# Patient Record
Sex: MALE | Race: WHITE | Employment: OTHER | URBAN - NONMETROPOLITAN AREA
[De-identification: names, ages, dates, MRNs, and addresses within clinical notes are randomized per-mention and may not be internally consistent; named-entity substitution may affect disease eponyms.]

---

## 2017-05-17 ENCOUNTER — OFFICE VISIT (OUTPATIENT)
Dept: NEPHROLOGY | Age: 72
End: 2017-05-17

## 2017-05-17 VITALS
WEIGHT: 199 LBS | RESPIRATION RATE: 16 BRPM | HEART RATE: 64 BPM | BODY MASS INDEX: 24.87 KG/M2 | DIASTOLIC BLOOD PRESSURE: 64 MMHG | SYSTOLIC BLOOD PRESSURE: 126 MMHG

## 2017-05-17 DIAGNOSIS — E87.20 METABOLIC ACIDEMIA: ICD-10-CM

## 2017-05-17 DIAGNOSIS — R80.9 PROTEINURIA: ICD-10-CM

## 2017-05-17 DIAGNOSIS — I10 ESSENTIAL HYPERTENSION: ICD-10-CM

## 2017-05-17 DIAGNOSIS — N18.4 CKD (CHRONIC KIDNEY DISEASE) STAGE 4, GFR 15-29 ML/MIN (HCC): Primary | ICD-10-CM

## 2017-05-17 DIAGNOSIS — E55.9 VITAMIN D DEFICIENCY: ICD-10-CM

## 2017-05-17 PROCEDURE — 4040F PNEUMOC VAC/ADMIN/RCVD: CPT | Performed by: INTERNAL MEDICINE

## 2017-05-17 PROCEDURE — 99214 OFFICE O/P EST MOD 30 MIN: CPT | Performed by: INTERNAL MEDICINE

## 2017-05-17 PROCEDURE — 1036F TOBACCO NON-USER: CPT | Performed by: INTERNAL MEDICINE

## 2017-05-17 PROCEDURE — 3017F COLORECTAL CA SCREEN DOC REV: CPT | Performed by: INTERNAL MEDICINE

## 2017-05-17 PROCEDURE — G8420 CALC BMI NORM PARAMETERS: HCPCS | Performed by: INTERNAL MEDICINE

## 2017-05-17 PROCEDURE — 1123F ACP DISCUSS/DSCN MKR DOCD: CPT | Performed by: INTERNAL MEDICINE

## 2017-05-17 PROCEDURE — G8427 DOCREV CUR MEDS BY ELIG CLIN: HCPCS | Performed by: INTERNAL MEDICINE

## 2017-05-17 RX ORDER — SODIUM BICARBONATE 650 MG/1
650 TABLET ORAL 2 TIMES DAILY
Qty: 60 TABLET | Refills: 11 | Status: SHIPPED | OUTPATIENT
Start: 2017-05-17 | End: 2018-05-17

## 2017-11-06 ENCOUNTER — HOSPITAL ENCOUNTER (OUTPATIENT)
Age: 72
Discharge: HOME | End: 2017-11-06
Payer: MEDICARE

## 2017-11-06 DIAGNOSIS — E55.9 VITAMIN D DEFICIENCY: ICD-10-CM

## 2017-11-06 DIAGNOSIS — R80.9 PROTEINURIA: ICD-10-CM

## 2017-11-06 DIAGNOSIS — N18.4 CKD (CHRONIC KIDNEY DISEASE) STAGE 4, GFR 15-29 ML/MIN (HCC): ICD-10-CM

## 2017-11-06 LAB
ALBUMIN SERPL-MCNC: 4.3 G/DL (ref 3.5–5.1)
ANION GAP SERPL CALCULATED.3IONS-SCNC: 21 MEQ/L (ref 8–16)
BUN BLDV-MCNC: 53 MG/DL (ref 7–22)
CALCIUM SERPL-MCNC: 10 MG/DL (ref 8.5–10.5)
CHLORIDE BLD-SCNC: 108 MEQ/L (ref 98–111)
CO2: 18 MEQ/L (ref 23–33)
CREAT SERPL-MCNC: 2.9 MG/DL (ref 0.4–1.2)
CREATININE URINE: 90.9 MG/DL
GFR SERPL CREATININE-BSD FRML MDRD: 21 ML/MIN/1.73M2
GLUCOSE BLD-MCNC: 118 MG/DL (ref 70–108)
PHOSPHORUS: 3.6 MG/DL (ref 2.4–4.7)
POTASSIUM SERPL-SCNC: 4.8 MEQ/L (ref 3.5–5.2)
PROT/CREAT RATIO, UR: 1.28
PROTEIN, URINE: 116.5 MG/DL
PTH INTACT: 44.8 PG/ML (ref 15–65)
SODIUM BLD-SCNC: 147 MEQ/L (ref 135–145)
TOTAL PROTEIN: 8 G/DL (ref 6.1–8)
VITAMIN D 25-HYDROXY: 43 NG/ML (ref 30–100)

## 2017-11-06 PROCEDURE — 84155 ASSAY OF PROTEIN SERUM: CPT

## 2017-11-06 PROCEDURE — 83970 ASSAY OF PARATHORMONE: CPT

## 2017-11-06 PROCEDURE — 84100 ASSAY OF PHOSPHORUS: CPT

## 2017-11-06 PROCEDURE — 84156 ASSAY OF PROTEIN URINE: CPT

## 2017-11-06 PROCEDURE — 36415 COLL VENOUS BLD VENIPUNCTURE: CPT

## 2017-11-06 PROCEDURE — 80048 BASIC METABOLIC PNL TOTAL CA: CPT

## 2017-11-06 PROCEDURE — 82306 VITAMIN D 25 HYDROXY: CPT

## 2017-11-06 PROCEDURE — 82040 ASSAY OF SERUM ALBUMIN: CPT

## 2017-11-06 PROCEDURE — 82570 ASSAY OF URINE CREATININE: CPT

## 2017-11-15 ENCOUNTER — OFFICE VISIT (OUTPATIENT)
Dept: NEPHROLOGY | Age: 72
End: 2017-11-15
Payer: MEDICARE

## 2017-11-15 VITALS — DIASTOLIC BLOOD PRESSURE: 80 MMHG | BODY MASS INDEX: 25.5 KG/M2 | SYSTOLIC BLOOD PRESSURE: 124 MMHG | WEIGHT: 204 LBS

## 2017-11-15 DIAGNOSIS — R80.1 PERSISTENT PROTEINURIA: ICD-10-CM

## 2017-11-15 DIAGNOSIS — N18.4 CKD (CHRONIC KIDNEY DISEASE) STAGE 4, GFR 15-29 ML/MIN (HCC): Primary | ICD-10-CM

## 2017-11-15 DIAGNOSIS — E87.20 METABOLIC ACIDOSIS: ICD-10-CM

## 2017-11-15 DIAGNOSIS — E87.0 HYPERNATREMIA: ICD-10-CM

## 2017-11-15 DIAGNOSIS — I10 ESSENTIAL HYPERTENSION: ICD-10-CM

## 2017-11-15 PROCEDURE — 99214 OFFICE O/P EST MOD 30 MIN: CPT | Performed by: INTERNAL MEDICINE

## 2017-11-15 PROCEDURE — G8427 DOCREV CUR MEDS BY ELIG CLIN: HCPCS | Performed by: INTERNAL MEDICINE

## 2017-11-15 PROCEDURE — 4040F PNEUMOC VAC/ADMIN/RCVD: CPT | Performed by: INTERNAL MEDICINE

## 2017-11-15 PROCEDURE — G8419 CALC BMI OUT NRM PARAM NOF/U: HCPCS | Performed by: INTERNAL MEDICINE

## 2017-11-15 PROCEDURE — G8484 FLU IMMUNIZE NO ADMIN: HCPCS | Performed by: INTERNAL MEDICINE

## 2017-11-15 PROCEDURE — 1123F ACP DISCUSS/DSCN MKR DOCD: CPT | Performed by: INTERNAL MEDICINE

## 2017-11-15 PROCEDURE — 1036F TOBACCO NON-USER: CPT | Performed by: INTERNAL MEDICINE

## 2017-11-15 PROCEDURE — 3017F COLORECTAL CA SCREEN DOC REV: CPT | Performed by: INTERNAL MEDICINE

## 2017-11-15 NOTE — PROGRESS NOTES
ALKPHOS 84 11/16/2016    ALKPHOS 80 11/07/2016    ALKPHOS 67 04/20/2015     BNP: No results found for: BNP  Lipids:   Lab Results   Component Value Date    CHOL 201 (H) 11/07/2016    HDL 41 11/07/2016     INR: No results found for: INR  URINE:   Lab Results   Component Value Date    PROTUR 116.5 11/06/2017     No results found for: [place], PHUR, LABCAST, WBCUA, RBCUA, MUCUS, TRICHOMONAS, YEAST, BACTERIA, CLARITYU, SPECGRAV, LEUKOCYTESUR, UROBILINOGEN, BILIRUBINUR, BLOODU, GLUCOSEU, KETUA, AMORPHOUS   Microalbumen/Creatinine ratio:  No components found for: RUCREAT    Objective:   Vitals: /80   Wt 204 lb (92.5 kg)   BMI 25.50 kg/m²      Constitutional:  Alert, awake, no apparent distress  Skin:normal  HEENT:Pupils are reactive . Throat is clear  Neck:supple with no thyromegally  Cardiovascular:  S1, S2 without m/r/g  Respiratory:  CTA B without w/r/r  Abdomen: +bs, soft, nt  Ext: no LE edema  Musculoskeletal:Intact  Neuro:Alert and oriented with no deficit    Electronically signed by Lien Loredo MD on 11/15/2017 at 10:31 AM

## 2018-05-07 ENCOUNTER — HOSPITAL ENCOUNTER (OUTPATIENT)
Age: 73
Discharge: HOME | End: 2018-05-07
Payer: MEDICARE

## 2018-05-07 DIAGNOSIS — N18.4 CKD (CHRONIC KIDNEY DISEASE) STAGE 4, GFR 15-29 ML/MIN (HCC): ICD-10-CM

## 2018-05-07 LAB
ALBUMIN SERPL-MCNC: 3.8 G/DL (ref 3.5–5.1)
ALP BLD-CCNC: 74 U/L (ref 38–126)
ALT SERPL-CCNC: 12 U/L (ref 11–66)
ANION GAP SERPL CALCULATED.3IONS-SCNC: 18 MEQ/L (ref 8–16)
AST SERPL-CCNC: 13 U/L (ref 5–40)
BILIRUB SERPL-MCNC: 0.5 MG/DL (ref 0.3–1.2)
BUN BLDV-MCNC: 55 MG/DL (ref 7–22)
CALCIUM SERPL-MCNC: 8.7 MG/DL (ref 8.5–10.5)
CHLORIDE BLD-SCNC: 108 MEQ/L (ref 98–111)
CHOLESTEROL, FASTING: 175 MG/DL (ref 100–199)
CO2: 18 MEQ/L (ref 23–33)
CREAT SERPL-MCNC: 3.3 MG/DL (ref 0.4–1.2)
GFR SERPL CREATININE-BSD FRML MDRD: 18 ML/MIN/1.73M2
GLUCOSE BLD-MCNC: 106 MG/DL (ref 70–108)
HDLC SERPL-MCNC: 40 MG/DL
LDL CHOLESTEROL CALCULATED: 109 MG/DL
POTASSIUM SERPL-SCNC: 4.7 MEQ/L (ref 3.5–5.2)
SODIUM BLD-SCNC: 144 MEQ/L (ref 135–145)
TOTAL PROTEIN: 7.2 G/DL (ref 6.1–8)
TRIGLYCERIDE, FASTING: 131 MG/DL (ref 0–199)

## 2018-05-07 PROCEDURE — 80053 COMPREHEN METABOLIC PANEL: CPT

## 2018-05-07 PROCEDURE — 80061 LIPID PANEL: CPT

## 2018-05-07 PROCEDURE — 36415 COLL VENOUS BLD VENIPUNCTURE: CPT

## 2018-05-16 ENCOUNTER — OFFICE VISIT (OUTPATIENT)
Dept: NEPHROLOGY | Age: 73
End: 2018-05-16
Payer: MEDICARE

## 2018-05-16 VITALS
SYSTOLIC BLOOD PRESSURE: 108 MMHG | RESPIRATION RATE: 18 BRPM | HEART RATE: 60 BPM | WEIGHT: 202 LBS | BODY MASS INDEX: 25.25 KG/M2 | DIASTOLIC BLOOD PRESSURE: 62 MMHG

## 2018-05-16 DIAGNOSIS — R80.1 PERSISTENT PROTEINURIA: ICD-10-CM

## 2018-05-16 DIAGNOSIS — N18.4 CKD (CHRONIC KIDNEY DISEASE), STAGE IV (HCC): Primary | ICD-10-CM

## 2018-05-16 DIAGNOSIS — E55.9 VITAMIN D DEFICIENCY: ICD-10-CM

## 2018-05-16 DIAGNOSIS — E87.20 METABOLIC ACIDOSIS: ICD-10-CM

## 2018-05-16 DIAGNOSIS — I10 ESSENTIAL HYPERTENSION: ICD-10-CM

## 2018-05-16 PROCEDURE — G8427 DOCREV CUR MEDS BY ELIG CLIN: HCPCS | Performed by: INTERNAL MEDICINE

## 2018-05-16 PROCEDURE — 3017F COLORECTAL CA SCREEN DOC REV: CPT | Performed by: INTERNAL MEDICINE

## 2018-05-16 PROCEDURE — 99214 OFFICE O/P EST MOD 30 MIN: CPT | Performed by: INTERNAL MEDICINE

## 2018-05-16 PROCEDURE — 1036F TOBACCO NON-USER: CPT | Performed by: INTERNAL MEDICINE

## 2018-05-16 PROCEDURE — G8419 CALC BMI OUT NRM PARAM NOF/U: HCPCS | Performed by: INTERNAL MEDICINE

## 2018-05-16 PROCEDURE — 1123F ACP DISCUSS/DSCN MKR DOCD: CPT | Performed by: INTERNAL MEDICINE

## 2018-05-16 PROCEDURE — 4040F PNEUMOC VAC/ADMIN/RCVD: CPT | Performed by: INTERNAL MEDICINE

## 2018-05-16 RX ORDER — LISINOPRIL 10 MG/1
10 TABLET ORAL DAILY
Qty: 90 TABLET | Refills: 3 | Status: SHIPPED | OUTPATIENT
Start: 2018-05-16 | End: 2018-05-16

## 2018-08-06 ENCOUNTER — HOSPITAL ENCOUNTER (OUTPATIENT)
Age: 73
Discharge: HOME | End: 2018-08-06
Payer: MEDICARE

## 2018-08-06 DIAGNOSIS — N18.4 CKD (CHRONIC KIDNEY DISEASE), STAGE IV (HCC): ICD-10-CM

## 2018-08-06 DIAGNOSIS — E55.9 VITAMIN D DEFICIENCY: ICD-10-CM

## 2018-08-06 DIAGNOSIS — R80.1 PERSISTENT PROTEINURIA: ICD-10-CM

## 2018-08-06 LAB
ANION GAP SERPL CALCULATED.3IONS-SCNC: 18 MEQ/L (ref 8–16)
BUN BLDV-MCNC: 60 MG/DL (ref 7–22)
CALCIUM SERPL-MCNC: 9.5 MG/DL (ref 8.5–10.5)
CHLORIDE BLD-SCNC: 106 MEQ/L (ref 98–111)
CO2: 19 MEQ/L (ref 23–33)
CREAT SERPL-MCNC: 3.3 MG/DL (ref 0.4–1.2)
CREATININE URINE: 84.1 MG/DL
GFR SERPL CREATININE-BSD FRML MDRD: 18 ML/MIN/1.73M2
GLUCOSE BLD-MCNC: 112 MG/DL (ref 70–108)
PHOSPHORUS: 3.6 MG/DL (ref 2.4–4.7)
POTASSIUM SERPL-SCNC: 5.4 MEQ/L (ref 3.5–5.2)
PROT/CREAT RATIO, UR: 0.93
PROTEIN, URINE: 78.4 MG/DL
PTH INTACT: 67.3 PG/ML (ref 15–65)
SODIUM BLD-SCNC: 143 MEQ/L (ref 135–145)
VITAMIN D 25-HYDROXY: 52 NG/ML (ref 30–100)

## 2018-08-06 PROCEDURE — 82570 ASSAY OF URINE CREATININE: CPT

## 2018-08-06 PROCEDURE — 36415 COLL VENOUS BLD VENIPUNCTURE: CPT

## 2018-08-06 PROCEDURE — 82306 VITAMIN D 25 HYDROXY: CPT

## 2018-08-06 PROCEDURE — 80048 BASIC METABOLIC PNL TOTAL CA: CPT

## 2018-08-06 PROCEDURE — 84100 ASSAY OF PHOSPHORUS: CPT

## 2018-08-06 PROCEDURE — 83970 ASSAY OF PARATHORMONE: CPT

## 2018-08-06 PROCEDURE — 84156 ASSAY OF PROTEIN URINE: CPT

## 2018-08-07 ENCOUNTER — TELEPHONE (OUTPATIENT)
Dept: NEPHROLOGY | Age: 73
End: 2018-08-07

## 2018-08-07 DIAGNOSIS — E87.5 HYPERKALEMIA: Primary | ICD-10-CM

## 2018-08-07 RX ORDER — SODIUM POLYSTYRENE SULFONATE 15 G/60ML
15 SUSPENSION ORAL; RECTAL DAILY
Qty: 60 ML | Refills: 0 | Status: SHIPPED | OUTPATIENT
Start: 2018-08-07 | End: 2018-11-14 | Stop reason: ALTCHOICE

## 2018-08-14 ENCOUNTER — HOSPITAL ENCOUNTER (OUTPATIENT)
Age: 73
Discharge: HOME | End: 2018-08-14
Payer: MEDICARE

## 2018-08-14 DIAGNOSIS — E87.5 HYPERKALEMIA: ICD-10-CM

## 2018-08-14 LAB — POTASSIUM SERPL-SCNC: 4.6 MEQ/L (ref 3.5–5.2)

## 2018-08-14 PROCEDURE — 36415 COLL VENOUS BLD VENIPUNCTURE: CPT

## 2018-08-14 PROCEDURE — 84132 ASSAY OF SERUM POTASSIUM: CPT

## 2018-08-22 ENCOUNTER — OFFICE VISIT (OUTPATIENT)
Dept: NEPHROLOGY | Age: 73
End: 2018-08-22
Payer: MEDICARE

## 2018-08-22 VITALS
BODY MASS INDEX: 25 KG/M2 | WEIGHT: 200 LBS | HEART RATE: 84 BPM | SYSTOLIC BLOOD PRESSURE: 118 MMHG | RESPIRATION RATE: 18 BRPM | DIASTOLIC BLOOD PRESSURE: 70 MMHG

## 2018-08-22 DIAGNOSIS — N18.4 CKD (CHRONIC KIDNEY DISEASE) STAGE 4, GFR 15-29 ML/MIN (HCC): Primary | ICD-10-CM

## 2018-08-22 DIAGNOSIS — N25.81 HYPERPARATHYROIDISM, SECONDARY RENAL (HCC): ICD-10-CM

## 2018-08-22 DIAGNOSIS — I10 ESSENTIAL HYPERTENSION: ICD-10-CM

## 2018-08-22 DIAGNOSIS — E87.20 METABOLIC ACIDOSIS: ICD-10-CM

## 2018-08-22 DIAGNOSIS — R80.1 PERSISTENT PROTEINURIA: ICD-10-CM

## 2018-08-22 PROCEDURE — 1101F PT FALLS ASSESS-DOCD LE1/YR: CPT | Performed by: INTERNAL MEDICINE

## 2018-08-22 PROCEDURE — 4040F PNEUMOC VAC/ADMIN/RCVD: CPT | Performed by: INTERNAL MEDICINE

## 2018-08-22 PROCEDURE — G8419 CALC BMI OUT NRM PARAM NOF/U: HCPCS | Performed by: INTERNAL MEDICINE

## 2018-08-22 PROCEDURE — G8427 DOCREV CUR MEDS BY ELIG CLIN: HCPCS | Performed by: INTERNAL MEDICINE

## 2018-08-22 PROCEDURE — 99214 OFFICE O/P EST MOD 30 MIN: CPT | Performed by: INTERNAL MEDICINE

## 2018-08-22 PROCEDURE — 1123F ACP DISCUSS/DSCN MKR DOCD: CPT | Performed by: INTERNAL MEDICINE

## 2018-08-22 PROCEDURE — 3017F COLORECTAL CA SCREEN DOC REV: CPT | Performed by: INTERNAL MEDICINE

## 2018-08-22 PROCEDURE — 1036F TOBACCO NON-USER: CPT | Performed by: INTERNAL MEDICINE

## 2018-08-22 NOTE — PATIENT INSTRUCTIONS
Please stop the lisinopril with hydrochlorothiazide. Check your blood pressure twice a day mornings and evenings for 5 days and call us with the results.

## 2018-08-22 NOTE — PROGRESS NOTES
result we asked him to come back in 2 months. His recent potassium level was high at 5.4 mEq per liter. We will give 1 dose of Kayexalate. Repeat potassium is 4.6 mEq per liter which is normal.  He also has had trouble with his hearing. He was thought to have any infection and was prescribed antibiotic amoxicillin lab. He is going to see an ear specialist according to him. He eats [employer] tomatoes and also has been drinking orange juice which will account for high potassium level    ROS:Constitutional: negative  Eyes: negative  Ears, nose, mouth, throat, and face: positive for hearing loss  Respiratory: negative  Cardiovascular: negative  Gastrointestinal: negative  Genitourinary:negative  Integument/breast: negative  Hematologic/lymphatic: negative  Musculoskeletal:positive for arthralgias and stiff joints  Neurological: negative  Behavioral/Psych: negative  Endocrine: negative  Allergic/Immunologic: negative  Medications:     Current Outpatient Prescriptions   Medication Sig Dispense Refill    lisinopril-hydrochlorothiazide (PRINZIDE;ZESTORETIC) 10-12.5 MG per tablet   0    aspirin 81 MG tablet Take 81 mg by mouth daily.  allopurinol (ZYLOPRIM) 100 MG tablet Take 300 mg by mouth daily       Vitamin D (CHOLECALCIFEROL) 1000 UNITS CAPS capsule Take 1,000 Units by mouth daily.  Omega-3 Fatty Acids (FISH OIL) 1000 MG CAPS Take 3,000 mg by mouth daily       sodium polystyrene (KAYEXALATE) 15 GM/60ML suspension Take 60 mLs by mouth daily for 1 day 60 mL 0     No current facility-administered medications for this visit.         Lab Results:    CBC:   Lab Results   Component Value Date    WBC 10.0 03/06/2017    HGB 17.1 03/06/2017    HCT 52.0 03/06/2017    MCV 94.7 (H) 03/06/2017     03/06/2017     BMP:    Lab Results   Component Value Date     08/06/2018     05/07/2018     (H) 11/06/2017    K 4.6 08/14/2018    K 5.4 (H) 08/06/2018    K 4.7 05/07/2018

## 2018-08-28 ENCOUNTER — TELEPHONE (OUTPATIENT)
Dept: NEPHROLOGY | Age: 73
End: 2018-08-28

## 2018-08-28 NOTE — TELEPHONE ENCOUNTER
I called pt and informed him that there will be no changes. Pt will continue to monitor his BP and call if the are continually high. He understood.

## 2018-08-28 NOTE — TELEPHONE ENCOUNTER
Pt called in w/BP readings:  08/23/18 136/76    139/72  8/24/18 128/75    135/72  8/25/18 133/77    133/78  8/26/18 142/80    135/76  8/27/18 138/84    143/79    Do you want to do anything different?

## 2018-11-05 ENCOUNTER — HOSPITAL ENCOUNTER (OUTPATIENT)
Age: 73
Discharge: HOME | End: 2018-11-05
Payer: MEDICARE

## 2018-11-05 DIAGNOSIS — N18.4 CKD (CHRONIC KIDNEY DISEASE) STAGE 4, GFR 15-29 ML/MIN (HCC): ICD-10-CM

## 2018-11-05 DIAGNOSIS — N25.81 HYPERPARATHYROIDISM, SECONDARY RENAL (HCC): ICD-10-CM

## 2018-11-05 LAB
ANION GAP SERPL CALCULATED.3IONS-SCNC: 14 MEQ/L (ref 8–16)
BUN BLDV-MCNC: 50 MG/DL (ref 7–22)
CALCIUM SERPL-MCNC: 8.8 MG/DL (ref 8.5–10.5)
CHLORIDE BLD-SCNC: 112 MEQ/L (ref 98–111)
CO2: 16 MEQ/L (ref 23–33)
CREAT SERPL-MCNC: 3.3 MG/DL (ref 0.4–1.2)
GFR SERPL CREATININE-BSD FRML MDRD: 18 ML/MIN/1.73M2
GLUCOSE BLD-MCNC: 107 MG/DL (ref 70–108)
PHOSPHORUS: 2.5 MG/DL (ref 2.4–4.7)
POTASSIUM SERPL-SCNC: 4.9 MEQ/L (ref 3.5–5.2)
PTH INTACT: 115.7 PG/ML (ref 15–65)
SODIUM BLD-SCNC: 142 MEQ/L (ref 135–145)
VITAMIN D 25-HYDROXY: 47 NG/ML (ref 30–100)

## 2018-11-05 PROCEDURE — 84100 ASSAY OF PHOSPHORUS: CPT

## 2018-11-05 PROCEDURE — 82306 VITAMIN D 25 HYDROXY: CPT

## 2018-11-05 PROCEDURE — 80048 BASIC METABOLIC PNL TOTAL CA: CPT

## 2018-11-05 PROCEDURE — 36415 COLL VENOUS BLD VENIPUNCTURE: CPT

## 2018-11-05 PROCEDURE — 83970 ASSAY OF PARATHORMONE: CPT

## 2018-11-14 ENCOUNTER — OFFICE VISIT (OUTPATIENT)
Dept: NEPHROLOGY | Age: 73
End: 2018-11-14
Payer: MEDICARE

## 2018-11-14 VITALS
WEIGHT: 208.6 LBS | SYSTOLIC BLOOD PRESSURE: 131 MMHG | OXYGEN SATURATION: 96 % | HEART RATE: 79 BPM | BODY MASS INDEX: 26.07 KG/M2 | DIASTOLIC BLOOD PRESSURE: 82 MMHG

## 2018-11-14 DIAGNOSIS — E55.9 VITAMIN D DEFICIENCY: ICD-10-CM

## 2018-11-14 DIAGNOSIS — I10 ESSENTIAL HYPERTENSION: ICD-10-CM

## 2018-11-14 DIAGNOSIS — N25.81 HYPERPARATHYROIDISM, SECONDARY RENAL (HCC): ICD-10-CM

## 2018-11-14 DIAGNOSIS — N18.4 CKD (CHRONIC KIDNEY DISEASE) STAGE 4, GFR 15-29 ML/MIN (HCC): Primary | ICD-10-CM

## 2018-11-14 PROCEDURE — 1036F TOBACCO NON-USER: CPT | Performed by: INTERNAL MEDICINE

## 2018-11-14 PROCEDURE — 4040F PNEUMOC VAC/ADMIN/RCVD: CPT | Performed by: INTERNAL MEDICINE

## 2018-11-14 PROCEDURE — 3017F COLORECTAL CA SCREEN DOC REV: CPT | Performed by: INTERNAL MEDICINE

## 2018-11-14 PROCEDURE — G8427 DOCREV CUR MEDS BY ELIG CLIN: HCPCS | Performed by: INTERNAL MEDICINE

## 2018-11-14 PROCEDURE — G8484 FLU IMMUNIZE NO ADMIN: HCPCS | Performed by: INTERNAL MEDICINE

## 2018-11-14 PROCEDURE — 99214 OFFICE O/P EST MOD 30 MIN: CPT | Performed by: INTERNAL MEDICINE

## 2018-11-14 PROCEDURE — 1101F PT FALLS ASSESS-DOCD LE1/YR: CPT | Performed by: INTERNAL MEDICINE

## 2018-11-14 PROCEDURE — G8419 CALC BMI OUT NRM PARAM NOF/U: HCPCS | Performed by: INTERNAL MEDICINE

## 2018-11-14 PROCEDURE — 1123F ACP DISCUSS/DSCN MKR DOCD: CPT | Performed by: INTERNAL MEDICINE

## 2018-11-14 RX ORDER — SODIUM BICARBONATE 650 MG/1
1300 TABLET ORAL 2 TIMES DAILY
Qty: 120 TABLET | Refills: 3 | Status: SHIPPED | OUTPATIENT
Start: 2018-11-14 | End: 2019-11-14

## 2018-11-14 RX ORDER — MULTIVIT-MIN/IRON/FOLIC ACID/K 18-600-40
2000 CAPSULE ORAL DAILY
COMMUNITY

## 2018-11-14 RX ORDER — ALLOPURINOL 300 MG/1
150 TABLET ORAL DAILY
COMMUNITY
End: 2022-09-07

## 2018-11-14 RX ORDER — LISINOPRIL 5 MG/1
TABLET ORAL
Refills: 0 | COMMUNITY
Start: 2018-11-07 | End: 2021-03-03 | Stop reason: ALTCHOICE

## 2019-05-22 ENCOUNTER — HOSPITAL ENCOUNTER (OUTPATIENT)
Age: 74
Discharge: HOME | End: 2019-05-22
Payer: MEDICARE

## 2019-05-22 ENCOUNTER — TELEPHONE (OUTPATIENT)
Dept: NEPHROLOGY | Age: 74
End: 2019-05-22

## 2019-05-22 LAB
ANION GAP SERPL CALCULATED.3IONS-SCNC: 16 MEQ/L (ref 8–16)
BUN BLDV-MCNC: 70 MG/DL (ref 7–22)
CALCIUM SERPL-MCNC: 8.8 MG/DL (ref 8.5–10.5)
CHLORIDE BLD-SCNC: 109 MEQ/L (ref 98–111)
CO2: 18 MEQ/L (ref 23–33)
CREAT SERPL-MCNC: 3.8 MG/DL (ref 0.4–1.2)
GFR SERPL CREATININE-BSD FRML MDRD: 16 ML/MIN/1.73M2
GLUCOSE BLD-MCNC: 119 MG/DL (ref 70–108)
PHOSPHORUS: 3.8 MG/DL (ref 2.4–4.7)
POTASSIUM SERPL-SCNC: 5.4 MEQ/L (ref 3.5–5.2)
PTH INTACT: 105.3 PG/ML (ref 15–65)
SODIUM BLD-SCNC: 143 MEQ/L (ref 135–145)
VITAMIN D 25-HYDROXY: 46 NG/ML (ref 30–100)

## 2019-05-22 PROCEDURE — 84100 ASSAY OF PHOSPHORUS: CPT

## 2019-05-22 PROCEDURE — 82306 VITAMIN D 25 HYDROXY: CPT

## 2019-05-22 PROCEDURE — 36415 COLL VENOUS BLD VENIPUNCTURE: CPT

## 2019-05-22 PROCEDURE — 80048 BASIC METABOLIC PNL TOTAL CA: CPT

## 2019-05-22 PROCEDURE — 83970 ASSAY OF PARATHORMONE: CPT

## 2019-05-22 NOTE — TELEPHONE ENCOUNTER
Called and spoke to the patient-I gave him this information-the patient states that he will not take the Kayexalate and he will see Dr. Josias Georges for an appointment next week

## 2019-05-22 NOTE — TELEPHONE ENCOUNTER
----- Message from Freddie Sims MD sent at 5/22/2019  4:27 PM EDT -----  Serum potassium is slightly high  Kayexalate 15 gm daily for 2 days   Repeat serum potassium in 5-6 days   Serum kidney is higher than before   Increase fluid intake

## 2019-05-22 NOTE — TELEPHONE ENCOUNTER
I called and left a message for the patient to call back to give him this information-I didn't find any other phone numbers to try to call

## 2019-05-29 ENCOUNTER — OFFICE VISIT (OUTPATIENT)
Dept: NEPHROLOGY | Age: 74
End: 2019-05-29
Payer: MEDICARE

## 2019-05-29 VITALS
OXYGEN SATURATION: 95 % | SYSTOLIC BLOOD PRESSURE: 132 MMHG | WEIGHT: 200 LBS | BODY MASS INDEX: 25 KG/M2 | HEART RATE: 79 BPM | DIASTOLIC BLOOD PRESSURE: 72 MMHG

## 2019-05-29 DIAGNOSIS — N25.81 HYPERPARATHYROIDISM, SECONDARY RENAL (HCC): ICD-10-CM

## 2019-05-29 DIAGNOSIS — E87.5 HYPERKALEMIA: ICD-10-CM

## 2019-05-29 DIAGNOSIS — I10 ESSENTIAL HYPERTENSION: ICD-10-CM

## 2019-05-29 DIAGNOSIS — R80.1 PERSISTENT PROTEINURIA: ICD-10-CM

## 2019-05-29 DIAGNOSIS — N18.4 CKD (CHRONIC KIDNEY DISEASE) STAGE 4, GFR 15-29 ML/MIN (HCC): Primary | ICD-10-CM

## 2019-05-29 PROCEDURE — G8419 CALC BMI OUT NRM PARAM NOF/U: HCPCS | Performed by: INTERNAL MEDICINE

## 2019-05-29 PROCEDURE — 1123F ACP DISCUSS/DSCN MKR DOCD: CPT | Performed by: INTERNAL MEDICINE

## 2019-05-29 PROCEDURE — 99214 OFFICE O/P EST MOD 30 MIN: CPT | Performed by: INTERNAL MEDICINE

## 2019-05-29 PROCEDURE — 3017F COLORECTAL CA SCREEN DOC REV: CPT | Performed by: INTERNAL MEDICINE

## 2019-05-29 PROCEDURE — G8427 DOCREV CUR MEDS BY ELIG CLIN: HCPCS | Performed by: INTERNAL MEDICINE

## 2019-05-29 PROCEDURE — 4040F PNEUMOC VAC/ADMIN/RCVD: CPT | Performed by: INTERNAL MEDICINE

## 2019-05-29 PROCEDURE — 1036F TOBACCO NON-USER: CPT | Performed by: INTERNAL MEDICINE

## 2019-05-29 NOTE — PROGRESS NOTES
Renal Progress Note    Assessment and Plan:      Diagnosis Orders   1. CKD (chronic kidney disease) stage 4, GFR 15-29 ml/min ([facility])     2. Essential hypertension     3. Hyperparathyroidism, secondary renal ([facility])     4. Persistent proteinuria     5. Hyperkalemia       PLAN:  REviewed lab results with the patient and he understood   Serum creatinine is increased to 3.8 mg/dl from 3.3 mg/dl 6 months ago  BUN is increased to 70 mg/dl from 50 mg/dl  This may be due to recent diarrhea with volume depletion  Serum potassium is 5.4 mEq/l but he refused kayexalate   Was provided with low potassium diet information  Serum potassium today  Increase fluid intake   Return visit in 3 months not 6 months     Patient Active Problem List   Diagnosis    Chronic kidney disease (CKD), stage III (moderate) ([facility])    Proteinuria    HTN (hypertension)    Gout    HLD (hyperlipidemia)    Prerenal azotemia    Vitamin D deficiency    Extensor tendon disruption    CKD (chronic kidney disease) stage 4, GFR 15-29 ml/min ([facility])    GWEN (acute kidney injury) ([address]    Deep vein thrombosis (DVT) of right lower extremity ([facility])    Metabolic acidemia    Metabolic acidosis    Hypernatremia    CKD (chronic kidney disease), stage IV ([address]    Hyperparathyroidism, secondary renal ([address]       Subjective:   Chief complaint:  Chief Complaint   Patient presents with    Chronic Kidney Disease     Stage IV       HPI:This is a follow up visit for Mr Juliano Jennings here today for a follow up appointment The potential side effects of this medication have been discussed with the patient. Call if any significant problems with these are experienced. Him for chronic kidney and was last seen about 6 months ago with serum creatinine of 3.3 mg/dl and now 3.8 mg/dl. Denies any new medications  . Had recent diarrhea     ROS:Constitutional: negative  Eyes: negative  Ears, nose, mouth, throat, and face: negative  Respiratory: negative  Cardiovascular:

## 2019-05-30 ENCOUNTER — HOSPITAL ENCOUNTER (OUTPATIENT)
Age: 74
Discharge: HOME | End: 2019-05-30
Payer: MEDICARE

## 2019-05-30 ENCOUNTER — TELEPHONE (OUTPATIENT)
Dept: NEPHROLOGY | Age: 74
End: 2019-05-30

## 2019-05-30 DIAGNOSIS — E87.5 HYPERKALEMIA: ICD-10-CM

## 2019-05-30 LAB — POTASSIUM SERPL-SCNC: 5.2 MEQ/L (ref 3.5–5.2)

## 2019-05-30 PROCEDURE — 84132 ASSAY OF SERUM POTASSIUM: CPT

## 2019-05-30 PROCEDURE — 36415 COLL VENOUS BLD VENIPUNCTURE: CPT

## 2019-05-30 NOTE — TELEPHONE ENCOUNTER
----- Message from Eric Santamaria MD sent at 5/30/2019  4:23 PM EDT -----  Serum potassium is back to normal

## 2019-08-27 ENCOUNTER — TELEPHONE (OUTPATIENT)
Dept: NEPHROLOGY | Age: 74
End: 2019-08-27

## 2019-08-27 ENCOUNTER — HOSPITAL ENCOUNTER (OUTPATIENT)
Age: 74
Discharge: HOME | End: 2019-08-27
Payer: MEDICARE

## 2019-08-27 DIAGNOSIS — N18.4 CKD (CHRONIC KIDNEY DISEASE) STAGE 4, GFR 15-29 ML/MIN (HCC): ICD-10-CM

## 2019-08-27 DIAGNOSIS — R80.1 PERSISTENT PROTEINURIA: ICD-10-CM

## 2019-08-27 DIAGNOSIS — N25.81 HYPERPARATHYROIDISM, SECONDARY RENAL (HCC): ICD-10-CM

## 2019-08-27 LAB
ANION GAP SERPL CALCULATED.3IONS-SCNC: 14 MEQ/L (ref 8–16)
BUN BLDV-MCNC: 54 MG/DL (ref 7–22)
CALCIUM SERPL-MCNC: 9.6 MG/DL (ref 8.5–10.5)
CHLORIDE BLD-SCNC: 111 MEQ/L (ref 98–111)
CO2: 19 MEQ/L (ref 23–33)
CREAT SERPL-MCNC: 3.2 MG/DL (ref 0.4–1.2)
CREATININE URINE: 70.3 MG/DL
GFR SERPL CREATININE-BSD FRML MDRD: 19 ML/MIN/1.73M2
GLUCOSE BLD-MCNC: 103 MG/DL (ref 70–108)
POTASSIUM SERPL-SCNC: 5.3 MEQ/L (ref 3.5–5.2)
PROT/CREAT RATIO, UR: 0.86
PROTEIN, URINE: 60.8 MG/DL
PTH INTACT: 39.1 PG/ML (ref 15–65)
SODIUM BLD-SCNC: 144 MEQ/L (ref 135–145)
VITAMIN D 25-HYDROXY: 48 NG/ML (ref 30–100)

## 2019-08-27 PROCEDURE — 82306 VITAMIN D 25 HYDROXY: CPT

## 2019-08-27 PROCEDURE — 36415 COLL VENOUS BLD VENIPUNCTURE: CPT

## 2019-08-27 PROCEDURE — 84156 ASSAY OF PROTEIN URINE: CPT

## 2019-08-27 PROCEDURE — 83970 ASSAY OF PARATHORMONE: CPT

## 2019-08-27 PROCEDURE — 82570 ASSAY OF URINE CREATININE: CPT

## 2019-08-27 PROCEDURE — 80048 BASIC METABOLIC PNL TOTAL CA: CPT

## 2019-09-04 ENCOUNTER — OFFICE VISIT (OUTPATIENT)
Dept: NEPHROLOGY | Age: 74
End: 2019-09-04
Payer: MEDICARE

## 2019-09-04 VITALS
SYSTOLIC BLOOD PRESSURE: 118 MMHG | DIASTOLIC BLOOD PRESSURE: 82 MMHG | HEIGHT: 75 IN | BODY MASS INDEX: 24.79 KG/M2 | HEART RATE: 72 BPM | WEIGHT: 199.4 LBS

## 2019-09-04 DIAGNOSIS — E87.20 METABOLIC ACIDOSIS: ICD-10-CM

## 2019-09-04 DIAGNOSIS — E55.9 VITAMIN D DEFICIENCY: ICD-10-CM

## 2019-09-04 DIAGNOSIS — D63.8 ANEMIA OF CHRONIC DISEASE: ICD-10-CM

## 2019-09-04 DIAGNOSIS — I10 ESSENTIAL HYPERTENSION: ICD-10-CM

## 2019-09-04 DIAGNOSIS — E87.5 HYPERKALEMIA: ICD-10-CM

## 2019-09-04 DIAGNOSIS — R80.1 PERSISTENT PROTEINURIA: ICD-10-CM

## 2019-09-04 DIAGNOSIS — N18.4 CKD (CHRONIC KIDNEY DISEASE) STAGE 4, GFR 15-29 ML/MIN (HCC): Primary | ICD-10-CM

## 2019-09-04 PROCEDURE — 4040F PNEUMOC VAC/ADMIN/RCVD: CPT | Performed by: INTERNAL MEDICINE

## 2019-09-04 PROCEDURE — G8427 DOCREV CUR MEDS BY ELIG CLIN: HCPCS | Performed by: INTERNAL MEDICINE

## 2019-09-04 PROCEDURE — G8420 CALC BMI NORM PARAMETERS: HCPCS | Performed by: INTERNAL MEDICINE

## 2019-09-04 PROCEDURE — 1036F TOBACCO NON-USER: CPT | Performed by: INTERNAL MEDICINE

## 2019-09-04 PROCEDURE — 3017F COLORECTAL CA SCREEN DOC REV: CPT | Performed by: INTERNAL MEDICINE

## 2019-09-04 PROCEDURE — 99214 OFFICE O/P EST MOD 30 MIN: CPT | Performed by: INTERNAL MEDICINE

## 2019-09-04 PROCEDURE — 1123F ACP DISCUSS/DSCN MKR DOCD: CPT | Performed by: INTERNAL MEDICINE

## 2019-09-04 NOTE — PROGRESS NOTES
Renal Progress Note    Assessment and Plan:      Diagnosis Orders   1. CKD (chronic kidney disease) stage 4, GFR 15-29 ml/min ([facility])  Basic Metabolic Panel    Vitamin D 25 Hydroxy    PTH, Intact    Phosphorus   2. Essential hypertension     3. Persistent proteinuria     4. Hyperkalemia  Potassium   5. Vitamin D deficiency     6. Anemia of chronic disease  Ferritin    Hemoglobin and Hematocrit, Blood    Iron    IRON SATURATION   7. Metabolic acidosis        PLAN:   Lab results discussed with the patient. He understood. We went through the report item by item in the computer. Serum creatinine is back to baseline at 3.2 mg/dL from 3.8 mg/dL. Serum bicarb is slightly increased to 19 mEq/L from 18 mEq/L. Serum potassium is borderline high at 5.3 mEq/L. I encouraged him to go back to low potassium diet. Repeat potassium level next week. I encouraged him to take the sodium bicarbonate at least 1300 mg a day. He was supposed to take that 1300 mg twice a day. However currently he is taking it only 650 mg a day. Medications reviewed  No other changes  Return visit  back to 6 months with labs    Patient Active Problem List   Diagnosis    Chronic kidney disease [facility]CKD), stage III (moderate) ([facility]    Proteinuria    HTN (hypertension)    Gout    HLD (hyperlipidemia)    Prerenal azotemia    Vitamin D deficiency    Extensor tendon disruption    CKD (chronic kidney disease) stage 4, GFR 15-29 ml/min (H[facility])    GWEN (acute kidney injury) ([address]    Deep vein thrombosis (DVT) of right lower extremity ([facility])    Metabolic acidemia    Metabolic acidosis    Hypernatremia    CKD (chronic kidney disease), stage IV ([address]    Hyperparathyroidism, secondary renal ([address]       Subjective:   Chief complaint:  Chief Complaint   Patient presents with    Chronic Kidney Disease      HPI:This is a follow up visit for  Mr. Rohith Beltran who is here today for return appointment. I see him for chronic kidney disease.   Normally we see BUN 54 (H) 08/27/2019    BUN 70 (H) 05/22/2019    BUN 50 (H) 11/05/2018    CREATININE 3.2 (HH) 08/27/2019    CREATININE 3.8 (HH) 05/22/2019    CREATININE 3.3 (HH) 11/05/2018    GLUCOSE 103 08/27/2019    GLUCOSE 119 (H) 05/22/2019    GLUCOSE 107 11/05/2018      Hepatic:   Lab Results   Component Value Date    AST 13 05/07/2018    AST 18 11/16/2016    AST 20 11/07/2016    ALT 12 05/07/2018    ALT 14 11/16/2016    ALT 20 11/07/2016    BILITOT 0.5 05/07/2018    BILITOT 0.5 11/16/2016    BILITOT 0.3 11/07/2016    ALKPHOS 74 05/07/2018    ALKPHOS 84 11/16/2016    ALKPHOS 80 11/07/2016     BNP: No results found for: BNP  Lipids:   Lab Results   Component Value Date    CHOL 201 (H) 11/07/2016    HDL 40 05/07/2018     INR: No results found for: INR  URINE:   Lab Results   Component Value Date    PROTUR 60.8 08/27/2019     No results found for: NITRU, COLORU, PHUR, LABCAST, WBCUA, RBCUA, MUCUS, TRICHOMONAS, YEAST, BACTERIA, CLARITYU, SPECGRAV, LEUKOCYTESUR, UROBILINOGEN, BILIRUBINUR, BLOODU, GLUCOSEU, KETUA, AMORPHOUS   Microalbumen/Creatinine ratio:  No components found for: RUCREAT    Objective:   Vitals: /82 (Site: Left Upper Arm, Position: Sitting, Cuff Size: Medium Adult)   Pulse 72   Ht 6' 3\" (1.905 m)   Wt 199 lb 6.4 oz (90.4 kg)   BMI 24.92 kg/m²      Constitutional:  Alert, awake, no apparent distress  Skin:normal  HEENT:Pupils are reactive . Throat is clear  Neck:supple with no thyromegally  Cardiovascular:  S1, S2 without murmur or rubs  Respiratory: Clear to auscultation  Abdomen: +bs, soft,  nontender  Ext: No LE edema  Musculoskeletal:Intact  Neuro:Alert and oriented with no deficit    Electronically signed by Cooper Franklin MD on 9/4/2019 at 11:34 AM

## 2019-09-09 ENCOUNTER — HOSPITAL ENCOUNTER (OUTPATIENT)
Age: 74
Discharge: HOME | End: 2019-09-09
Payer: MEDICARE

## 2019-09-09 DIAGNOSIS — E87.5 HYPERKALEMIA: ICD-10-CM

## 2019-09-09 LAB — POTASSIUM SERPL-SCNC: 4.9 MEQ/L (ref 3.5–5.2)

## 2019-09-09 PROCEDURE — 84132 ASSAY OF SERUM POTASSIUM: CPT

## 2019-09-09 PROCEDURE — 36415 COLL VENOUS BLD VENIPUNCTURE: CPT

## 2019-09-10 ENCOUNTER — TELEPHONE (OUTPATIENT)
Dept: NEPHROLOGY | Age: 74
End: 2019-09-10

## 2020-02-26 ENCOUNTER — HOSPITAL ENCOUNTER (OUTPATIENT)
Age: 75
Discharge: HOME | End: 2020-02-26
Payer: MEDICARE

## 2020-02-26 DIAGNOSIS — N18.4 CKD (CHRONIC KIDNEY DISEASE) STAGE 4, GFR 15-29 ML/MIN (HCC): ICD-10-CM

## 2020-02-26 DIAGNOSIS — D63.8 ANEMIA OF CHRONIC DISEASE: ICD-10-CM

## 2020-02-26 LAB
ANION GAP SERPL CALCULATED.3IONS-SCNC: 16 MEQ/L (ref 8–16)
BUN BLDV-MCNC: 77 MG/DL (ref 7–22)
CALCIUM SERPL-MCNC: 9.1 MG/DL (ref 8.5–10.5)
CHLORIDE BLD-SCNC: 115 MEQ/L (ref 98–111)
CO2: 14 MEQ/L (ref 23–33)
CREAT SERPL-MCNC: 3.8 MG/DL (ref 0.4–1.2)
FERRITIN: 299 NG/ML (ref 22–322)
GFR SERPL CREATININE-BSD FRML MDRD: 16 ML/MIN/1.73M2
GLUCOSE BLD-MCNC: 113 MG/DL (ref 70–108)
HCT VFR BLD CALC: 50.2 % (ref 42–52)
HEMOGLOBIN: 15.3 GM/DL (ref 14–18)
IRON SATURATION: 51 % (ref 20–50)
IRON: 112 UG/DL (ref 65–195)
PHOSPHORUS: 4.1 MG/DL (ref 2.4–4.7)
POTASSIUM SERPL-SCNC: 5.3 MEQ/L (ref 3.5–5.2)
PTH INTACT: 70.8 PG/ML (ref 15–65)
SODIUM BLD-SCNC: 145 MEQ/L (ref 135–145)
TOTAL IRON BINDING CAPACITY: 221 UG/DL (ref 171–450)
VITAMIN D 25-HYDROXY: 42 NG/ML (ref 30–100)

## 2020-02-26 PROCEDURE — 83550 IRON BINDING TEST: CPT

## 2020-02-26 PROCEDURE — 82306 VITAMIN D 25 HYDROXY: CPT

## 2020-02-26 PROCEDURE — 85014 HEMATOCRIT: CPT

## 2020-02-26 PROCEDURE — 83540 ASSAY OF IRON: CPT

## 2020-02-26 PROCEDURE — 80048 BASIC METABOLIC PNL TOTAL CA: CPT

## 2020-02-26 PROCEDURE — 85018 HEMOGLOBIN: CPT

## 2020-02-26 PROCEDURE — 36415 COLL VENOUS BLD VENIPUNCTURE: CPT

## 2020-02-26 PROCEDURE — 82728 ASSAY OF FERRITIN: CPT

## 2020-02-26 PROCEDURE — 84100 ASSAY OF PHOSPHORUS: CPT

## 2020-02-26 PROCEDURE — 83970 ASSAY OF PARATHORMONE: CPT

## 2020-02-27 ENCOUNTER — TELEPHONE (OUTPATIENT)
Dept: NEPHROLOGY | Age: 75
End: 2020-02-27

## 2020-03-04 ENCOUNTER — OFFICE VISIT (OUTPATIENT)
Dept: NEPHROLOGY | Age: 75
End: 2020-03-04
Payer: MEDICARE

## 2020-03-04 VITALS
BODY MASS INDEX: 25.26 KG/M2 | WEIGHT: 196.8 LBS | DIASTOLIC BLOOD PRESSURE: 78 MMHG | SYSTOLIC BLOOD PRESSURE: 134 MMHG | HEART RATE: 64 BPM | HEIGHT: 74 IN

## 2020-03-04 PROCEDURE — 3017F COLORECTAL CA SCREEN DOC REV: CPT | Performed by: INTERNAL MEDICINE

## 2020-03-04 PROCEDURE — 99213 OFFICE O/P EST LOW 20 MIN: CPT | Performed by: INTERNAL MEDICINE

## 2020-03-04 PROCEDURE — G8427 DOCREV CUR MEDS BY ELIG CLIN: HCPCS | Performed by: INTERNAL MEDICINE

## 2020-03-04 PROCEDURE — 4040F PNEUMOC VAC/ADMIN/RCVD: CPT | Performed by: INTERNAL MEDICINE

## 2020-03-04 PROCEDURE — 1123F ACP DISCUSS/DSCN MKR DOCD: CPT | Performed by: INTERNAL MEDICINE

## 2020-03-04 PROCEDURE — G8482 FLU IMMUNIZE ORDER/ADMIN: HCPCS | Performed by: INTERNAL MEDICINE

## 2020-03-04 PROCEDURE — 1036F TOBACCO NON-USER: CPT | Performed by: INTERNAL MEDICINE

## 2020-03-04 PROCEDURE — G8417 CALC BMI ABV UP PARAM F/U: HCPCS | Performed by: INTERNAL MEDICINE

## 2020-03-04 RX ORDER — SODIUM BICARBONATE 325 MG/1
325 TABLET ORAL DAILY
COMMUNITY
End: 2021-03-03

## 2020-08-27 ENCOUNTER — HOSPITAL ENCOUNTER (OUTPATIENT)
Age: 75
Discharge: HOME | End: 2020-08-27
Payer: MEDICARE

## 2020-08-27 DIAGNOSIS — N25.81 HYPERPARATHYROIDISM, SECONDARY RENAL (HCC): ICD-10-CM

## 2020-08-27 DIAGNOSIS — N18.4 CKD (CHRONIC KIDNEY DISEASE) STAGE 4, GFR 15-29 ML/MIN (HCC): ICD-10-CM

## 2020-08-27 DIAGNOSIS — E55.9 VITAMIN D DEFICIENCY: ICD-10-CM

## 2020-08-27 DIAGNOSIS — D63.8 ANEMIA OF CHRONIC DISEASE: ICD-10-CM

## 2020-08-27 LAB
ANION GAP SERPL CALCULATED.3IONS-SCNC: 14 MEQ/L (ref 8–16)
BUN BLDV-MCNC: 62 MG/DL (ref 7–22)
CALCIUM SERPL-MCNC: 8.9 MG/DL (ref 8.5–10.5)
CHLORIDE BLD-SCNC: 117 MEQ/L (ref 98–111)
CO2: 15 MEQ/L (ref 23–33)
CREAT SERPL-MCNC: 3.5 MG/DL (ref 0.4–1.2)
CREATININE, URINE: 76.8 MG/DL
FERRITIN: 272 NG/ML (ref 22–322)
GFR SERPL CREATININE-BSD FRML MDRD: 17 ML/MIN/1.73M2
GLUCOSE BLD-MCNC: 100 MG/DL (ref 70–108)
HCT VFR BLD CALC: 47.7 % (ref 42–52)
HEMOGLOBIN: 14.6 GM/DL (ref 14–18)
IRON SATURATION: 40 % (ref 20–50)
IRON: 95 UG/DL (ref 65–195)
MICROALBUMIN UR-MCNC: 18.15 MG/DL
MICROALBUMIN/CREAT UR-RTO: 236 MG/G (ref 0–30)
POTASSIUM SERPL-SCNC: 4.9 MEQ/L (ref 3.5–5.2)
PTH INTACT: 85.9 PG/ML (ref 15–65)
SODIUM BLD-SCNC: 146 MEQ/L (ref 135–145)
TOTAL IRON BINDING CAPACITY: 238 UG/DL (ref 171–450)
VITAMIN D 25-HYDROXY: 46 NG/ML (ref 30–100)

## 2020-08-27 PROCEDURE — 83883 ASSAY NEPHELOMETRY NOT SPEC: CPT

## 2020-08-27 PROCEDURE — 82043 UR ALBUMIN QUANTITATIVE: CPT

## 2020-08-27 PROCEDURE — 85014 HEMATOCRIT: CPT

## 2020-08-27 PROCEDURE — 82728 ASSAY OF FERRITIN: CPT

## 2020-08-27 PROCEDURE — 83970 ASSAY OF PARATHORMONE: CPT

## 2020-08-27 PROCEDURE — 82306 VITAMIN D 25 HYDROXY: CPT

## 2020-08-27 PROCEDURE — 36415 COLL VENOUS BLD VENIPUNCTURE: CPT

## 2020-08-27 PROCEDURE — 80048 BASIC METABOLIC PNL TOTAL CA: CPT

## 2020-08-27 PROCEDURE — 83550 IRON BINDING TEST: CPT

## 2020-08-27 PROCEDURE — 85018 HEMOGLOBIN: CPT

## 2020-08-27 PROCEDURE — 83540 ASSAY OF IRON: CPT

## 2020-08-29 LAB — KAPPA/LAMBDA FREE LIGHT CHAINS: NORMAL

## 2020-09-02 ENCOUNTER — OFFICE VISIT (OUTPATIENT)
Dept: NEPHROLOGY | Age: 75
End: 2020-09-02
Payer: MEDICARE

## 2020-09-02 VITALS
OXYGEN SATURATION: 100 % | HEART RATE: 80 BPM | TEMPERATURE: 97.7 F | BODY MASS INDEX: 25.37 KG/M2 | SYSTOLIC BLOOD PRESSURE: 138 MMHG | DIASTOLIC BLOOD PRESSURE: 72 MMHG | WEIGHT: 197.6 LBS

## 2020-09-02 PROCEDURE — 1123F ACP DISCUSS/DSCN MKR DOCD: CPT | Performed by: INTERNAL MEDICINE

## 2020-09-02 PROCEDURE — 4040F PNEUMOC VAC/ADMIN/RCVD: CPT | Performed by: INTERNAL MEDICINE

## 2020-09-02 PROCEDURE — 1036F TOBACCO NON-USER: CPT | Performed by: INTERNAL MEDICINE

## 2020-09-02 PROCEDURE — G8427 DOCREV CUR MEDS BY ELIG CLIN: HCPCS | Performed by: INTERNAL MEDICINE

## 2020-09-02 PROCEDURE — G8417 CALC BMI ABV UP PARAM F/U: HCPCS | Performed by: INTERNAL MEDICINE

## 2020-09-02 PROCEDURE — 99213 OFFICE O/P EST LOW 20 MIN: CPT | Performed by: INTERNAL MEDICINE

## 2020-09-02 NOTE — PROGRESS NOTES
Renal Progress Note    Assessment and Plan:      Diagnosis Orders   1. CKD (chronic kidney disease), stage IV ([facility])  Basic Metabolic Panel    Vitamin D 25 Hydroxy    PTH, Intact   2. CKD (chronic kidney disease) stage 4, GFR 15-29 ml/min ([facility])     3. Metabolic acidosis     4. Essential hypertension     5. Persistent proteinuria     6. Anemia of chronic disease     7. Hyperparathyroidism, secondary renal ([facility])       1. Stage 4 CKD-stable. -The patient was counseled with regards to the importance of taking his bicarbonate. We told the patient that he could take the pill once a day and then just supplement his diet with baking soda. We will maintain patient on lisinopril 5 mg daily. Otherwise, treat as described above. We will follow-up in six months. 2. Metabolic Acidosis-stable. The patient's CO2 was only 13, which is less than the 24-26 that is considered ideal for a CKD patient.   -The patient was counseled and had his treatment modified as described above. We will follow-up in six months. 3. Essential Hypertension-stable. The patient's blood pressure today was 138/72.   -We will maintain patient on lisinopril as described above. We will follow-up in six months. 4. Persistent Proteinuria-stable. -We will obtain a new microalbumin/creatinine ratio in a year. Otherwise, we will treat as described above for CKD. 5. Anemia of chronic kidney disease-stable. The patient;s hemoglobin was normal at 14.6 as of his last set of labs on 8/27/2020.   -We will obtain new CBC in a year. Otherwise, treat as described above for CKD. 6. Hyperparathyroidism secondary to renal failure-stable. -We will continue to give the patient vitamin D 2,000 units per day. We will obtain new intact serum PTH concentration and 25 hydroxy vitamin D level in six months.        Patient Active Problem List   Diagnosis    Chronic kidney disease (CKD), stage III (moderate) ([facility])    Proteinuria    HTN (hypertension)    Gout  HLD (hyperlipidemia)    Prerenal azotemia    Vitamin D deficiency    Extensor tendon disruption    CKD (chronic kidney disease) stage 4, GFR 15-29 ml/min ([facility])    [facility]) [facility]    Deep vein thrombosis (DVT) of right lower extremity (H[facility])    Metabolic acidemia    Metabolic acidosis    Hypernatremia    CKD (chronic kidney disease), stage IV ([facility])    Hyperparathyroidism, secondary renal ([facility])       Subjective:   Chief complaint:  Chief Complaint   Patient presents with    Chronic Kidney Disease     stage 4       HPI:This is a six month follow up visit for Stage 4 CKD. The patient states that he is feeling fine, with no specific complaints or concerns. The patient was asked about his sodium bicarbonate pill, which he is supposed to take twice daily. The patient stated that he only takes this pill once a day because it gives him diarrhea. When the importance of taking this pill was explained, the patient stated that he would try again to take it twice a day. The patient was also asked about his potassium consumption, and the patient affirms that he sticks to a low potassium diet. The patient was asked about his vitamin D supplement, and he affirms that he takes it every day. A review of the labs showed an eGFR of 17, a normal iron binding capacity, a slightly high sodium, a high chloride, a normal potassium, a high BUN, and a low CO2 of 15. A vitamin D level was in the normal range. A PTH was high at 85.9. A microalbumin/creatinine ratio was high at 236. Constitutional: The patient denies fevers or chills. Cardiovascular: The patient denies chest pain, palpitations, and peripheral edema. Respiratory: The patient denies SOB and pleuritic chest pain. GI: The patient denies abdominal pain and nausea. : The patient affirms ongoing urination; he denies dysuria and hesitancy. ROS:Pertinent items are noted in HPI.   Medications:     Current Outpatient Medications   Medication Sig Dispense Refill    sodium bicarbonate 325 MG tablet Take 325 mg by mouth daily       allopurinol (ZYLOPRIM) 300 MG tablet Take 150 mg by mouth daily      Cholecalciferol (VITAMIN D) 2000 units CAPS capsule Take 2,000 Units by mouth daily      lisinopril (PRINIVIL;ZESTRIL) 5 MG tablet take 1 tablet by mouth once daily  0    aspirin 81 MG tablet Take 81 mg by mouth daily.  Omega-3 Fatty Acids (FISH OIL) 1000 MG CAPS Take 3,000 mg by mouth daily        No current facility-administered medications for this visit.         Lab Results:    CBC:   Lab Results   Component Value Date    WBC 10.0 03/06/2017    HGB 14.6 08/27/2020    HCT 47.7 08/27/2020    MCV 94.7 (H) 03/06/2017     03/06/2017     BMP:    Lab Results   Component Value Date     (H) 08/27/2020     02/26/2020     08/27/2019    K 4.9 08/27/2020    K 5.3 (H) 02/26/2020    K 4.9 09/09/2019     (H) 08/27/2020     (H) 02/26/2020     08/27/2019    CO2 15 (L) 08/27/2020    CO2 14 (L) 02/26/2020    CO2 19 (L) 08/27/2019    BUN 62 (H) 08/27/2020    BUN 77 (H) 02/26/2020    BUN 54 (H) 08/27/2019    CREATININE 3.5 (HH) 08/27/2020    CREATININE 3.8 (HH) 02/26/2020    CREATININE 3.2 (HH) 08/27/2019    GLUCOSE 100 08/27/2020    GLUCOSE 113 (H) 02/26/2020    GLUCOSE 103 08/27/2019      Hepatic:   Lab Results   Component Value Date    AST 13 05/07/2018    AST 18 11/16/2016    AST 20 11/07/2016    ALT 12 05/07/2018    ALT 14 11/16/2016    ALT 20 11/07/2016    BILITOT 0.5 05/07/2018    BILITOT 0.5 11/16/2016    BILITOT 0.3 11/07/2016    ALKPHOS 74 05/07/2018    ALKPHOS 84 11/16/2016    ALKPHOS 80 11/07/2016     BNP: No results found for: BNP  Lipids:   Lab Results   Component Value Date    CHOL 201 (H) 11/07/2016    HDL 40 05/07/2018     INR: No results found for: INR  URINE:   Lab Results   Component Value Date    PROTUR 60.8 08/27/2019     No results found for: AKILA Lind, LABCAST, [address], RBCUA, MUCUS, TRICHOMONAS, YEAST, BACTERIA, CLARITYU, SPECGRAV, LEUKOCYTESUR, UROBILINOGEN, BILIRUBINUR, BLOODU, GLUCOSEU, KETUA, AMORPHOUS   Microalbumen/Creatinine ratio:  No components found for: RUCREAT    Objective:   Vitals: /72 (Site: Right Upper Arm, Position: Sitting, Cuff Size: Large Adult)   Pulse 80   Temp 97.7 °F (36.5 °C)   Wt 197 lb 9.6 oz (89.6 kg)   SpO2 100%   BMI 25.37 kg/m²      Constitutional:  Awake, alert, and in no acute distress. Capable of answering questions and following commands. Speech is normal.   HEENT:Pupils are reactive . Throat is clear. Oral mucosa is moist.  Neck:supple with no thyromegaly or lymphadenopathy.    Cardiovascular:  S1, S2 without murmur   Respiratory:  Clear to auscultation with no wheezes or rales  Abdomen: +bowel sound, soft, and non tender  Ext: No LE edema  Musculoskeletal:Intact    Electronically signed by Carlos Eduardo Pickering MD, MPH, [facility] on 9/2/2020 at 6:33 PM   Supervised  By Dr. Jon Chance

## 2020-09-02 NOTE — PROGRESS NOTES
Renal Progress Note    Assessment and Plan:      Diagnosis Orders   1. CKD (chronic kidney disease), stage IV ([facility])  Basic Metabolic Panel    Vitamin D 25 Hydroxy    PTH, Intact   2. CKD (chronic kidney disease) stage 4, GFR 15-29 ml/min ([facility])     3. Metabolic acidosis     4. Essential hypertension     5. Persistent proteinuria     6. Anemia of chronic disease     7. Hyperparathyroidism, secondary renal ([address]     PLAN:   Labs reviewed and discussed with the patient  Serum creatinine is improved from before  Serum bicarbonate remains low but slightly improved from before  We again emphasized with the patient importance of taking the sodium bicarbonate as prescribed  Alternatively he can mix baking soda with water take 1 tablespoon 3 times a day if he is having trouble tolerating the oral sodium bicarbonate tablets. Medications reviewed  No other changes  Return in 6 months with labs      Patient Active Problem List   Diagnosis    Chronic kidney disease (CKD), stage III (moderate) ([facility])    Proteinuria    HTN (hypertension)    Gout    HLD (hyperlipidemia)    Prerenal azotemia    Vitamin D deficiency    Extensor tendon disruption    CKD (chronic kidney disease) stage 4, GFR 15-29 ml/min ([facility])    GWEN (acute kidney injury) ([address]    Deep vein thrombosis (DVT) of right lower extremity ([facility])    Metabolic acidemia    Metabolic acidosis    Hypernatremia    CKD (chronic kidney disease), stage IV ([address]    Hyperparathyroidism, secondary renal ([address]       Subjective:   Chief complaint:  Chief Complaint   Patient presents with    Chronic Kidney Disease     stage 4       HPI:This is a follow up visit for Mr. Karen Chacon is here today for return appointment. I see him for chronic kidney disease. Was last in about 6 months ago. Serum creatinine was 3.8 mg/dL. Today it is 3.4 g/dL. Doing well with no complaints.   He has not been taking his sodium bicarbonate as prescribed  Otherwise no chest pain or shortness of breath. No fever or chills. No nausea vomiting. ROS:Constitutional: negative  Eyes: negative  Ears, nose, mouth, throat, and face: negative  Respiratory: negative  Cardiovascular: negative  Gastrointestinal: negative  Genitourinary:negative  Integument/breast: negative  Hematologic/lymphatic: negative  Musculoskeletal:negative  Neurological: negative  Behavioral/Psych: negative  Endocrine: negative  Allergic/Immunologic: negative     Medications:     Current Outpatient Medications   Medication Sig Dispense Refill    sodium bicarbonate 325 MG tablet Take 325 mg by mouth daily       allopurinol (ZYLOPRIM) 300 MG tablet Take 150 mg by mouth daily      Cholecalciferol (VITAMIN D) 2000 units CAPS capsule Take 2,000 Units by mouth daily      lisinopril (PRINIVIL;ZESTRIL) 5 MG tablet take 1 tablet by mouth once daily  0    aspirin 81 MG tablet Take 81 mg by mouth daily.  Omega-3 Fatty Acids (FISH OIL) 1000 MG CAPS Take 3,000 mg by mouth daily        No current facility-administered medications for this visit.         Lab Results:    CBC:   Lab Results   Component Value Date    WBC 10.0 03/06/2017    HGB 14.6 08/27/2020    HCT 47.7 08/27/2020    MCV 94.7 (H) 03/06/2017     03/06/2017     BMP:    Lab Results   Component Value Date     (H) 08/27/2020     02/26/2020     08/27/2019    K 4.9 08/27/2020    K 5.3 (H) 02/26/2020    K 4.9 09/09/2019     (H) 08/27/2020     (H) 02/26/2020     08/27/2019    CO2 15 (L) 08/27/2020    CO2 14 (L) 02/26/2020    CO2 19 (L) 08/27/2019    BUN 62 (H) 08/27/2020    BUN 77 (H) 02/26/2020    BUN 54 (H) 08/27/2019    CREATININE 3.5 (HH) 08/27/2020    CREATININE 3.8 (HH) 02/26/2020    CREATININE 3.2 (HH) 08/27/2019    GLUCOSE 100 08/27/2020    GLUCOSE 113 (H) 02/26/2020    GLUCOSE 103 08/27/2019      Hepatic:   Lab Results   Component Value Date    AST 13 05/07/2018    AST 18 11/16/2016    AST 20 11/07/2016    ALT 12 05/07/2018 ALT 14 11/16/2016    ALT 20 11/07/2016    BILITOT 0.5 05/07/2018    BILITOT 0.5 11/16/2016    BILITOT 0.3 11/07/2016    ALKPHOS 74 05/07/2018    ALKPHOS 84 11/16/2016    ALKPHOS 80 11/07/2016     BNP: No results found for: BNP  Lipids:   Lab Results   Component Value Date    CHOL 201 (H) 11/07/2016    HDL 40 05/07/2018     INR: No results found for: INR  URINE:   Lab Results   Component Value Date    PROTUR 60.8 08/27/2019     No results found for: NITRU, COLORU, PHUR, LABCAST, WBCUA, RBCUA, MUCUS, TRICHOMONAS, YEAST, BACTERIA, CLARITYU, SPECGRAV, LEUKOCYTESUR, UROBILINOGEN, BILIRUBINUR, BLOODU, GLUCOSEU, KETUA, AMORPHOUS   Microalbumen/Creatinine ratio:  No components found for: RUCREAT    Objective:   Vitals: /72 (Site: Right Upper Arm, Position: Sitting, Cuff Size: Large Adult)   Pulse 80   Temp 97.7 °F (36.5 °C)   Wt 197 lb 9.6 oz (89.6 kg)   SpO2 100%   BMI 25.37 kg/m²      Constitutional:  Alert, awake, no apparent distress  Skin:normal with no rash or any lesions  HEENT:Pupils are reactive . Throat is clear. Oral mucosa is moist.  Neck:supple with no thyromegaly or bruit   Cardiovascular:  S1, S2 without murmur   Respiratory:  Clear to auscultation with no wheezes or rales  Abdomen: +bowel sound, soft, non tender and no bruit  Ext: No LE edema  Musculoskeletal:Intact  Neuro:Alert, awake and oriented with no obvious focal deficit.   Speech is normal.    Electronically signed by Margarita Cortés MD on 9/2/2020 at 4:13 PM

## 2021-02-24 ENCOUNTER — HOSPITAL ENCOUNTER (OUTPATIENT)
Age: 76
Discharge: HOME | End: 2021-02-24
Payer: MEDICARE

## 2021-02-24 DIAGNOSIS — N18.4 CKD (CHRONIC KIDNEY DISEASE), STAGE IV (HCC): ICD-10-CM

## 2021-02-24 LAB
ANION GAP SERPL CALCULATED.3IONS-SCNC: 12 MEQ/L (ref 8–16)
BUN BLDV-MCNC: 62 MG/DL (ref 7–22)
CALCIUM SERPL-MCNC: 8.8 MG/DL (ref 8.5–10.5)
CHLORIDE BLD-SCNC: 113 MEQ/L (ref 98–111)
CHOLESTEROL, TOTAL: 193 MG/DL (ref 100–199)
CO2: 20 MEQ/L (ref 23–33)
CREAT SERPL-MCNC: 4.1 MG/DL (ref 0.4–1.2)
GFR SERPL CREATININE-BSD FRML MDRD: 14 ML/MIN/1.73M2
GLUCOSE BLD-MCNC: 98 MG/DL (ref 70–108)
HDLC SERPL-MCNC: 28 MG/DL
LDL CHOLESTEROL CALCULATED: 123 MG/DL
POTASSIUM SERPL-SCNC: 5.2 MEQ/L (ref 3.5–5.2)
PTH INTACT: 160 PG/ML (ref 15–65)
SODIUM BLD-SCNC: 145 MEQ/L (ref 135–145)
TRIGL SERPL-MCNC: 210 MG/DL (ref 0–199)
VITAMIN D 25-HYDROXY: 42 NG/ML (ref 30–100)

## 2021-02-24 PROCEDURE — 80048 BASIC METABOLIC PNL TOTAL CA: CPT

## 2021-02-24 PROCEDURE — 82306 VITAMIN D 25 HYDROXY: CPT

## 2021-02-24 PROCEDURE — 36415 COLL VENOUS BLD VENIPUNCTURE: CPT

## 2021-02-24 PROCEDURE — 83970 ASSAY OF PARATHORMONE: CPT

## 2021-02-24 PROCEDURE — 80061 LIPID PANEL: CPT

## 2021-03-03 ENCOUNTER — OFFICE VISIT (OUTPATIENT)
Dept: NEPHROLOGY | Age: 76
End: 2021-03-03
Payer: MEDICARE

## 2021-03-03 VITALS
BODY MASS INDEX: 25.83 KG/M2 | OXYGEN SATURATION: 96 % | TEMPERATURE: 97.7 F | DIASTOLIC BLOOD PRESSURE: 70 MMHG | HEART RATE: 80 BPM | SYSTOLIC BLOOD PRESSURE: 138 MMHG | WEIGHT: 201.2 LBS

## 2021-03-03 DIAGNOSIS — R80.1 PERSISTENT PROTEINURIA: ICD-10-CM

## 2021-03-03 DIAGNOSIS — N25.81 HYPERPARATHYROIDISM, SECONDARY RENAL (HCC): ICD-10-CM

## 2021-03-03 DIAGNOSIS — E55.9 VITAMIN D DEFICIENCY: ICD-10-CM

## 2021-03-03 DIAGNOSIS — E87.20 METABOLIC ACIDOSIS: ICD-10-CM

## 2021-03-03 DIAGNOSIS — I10 ESSENTIAL HYPERTENSION: ICD-10-CM

## 2021-03-03 DIAGNOSIS — N18.4 CKD (CHRONIC KIDNEY DISEASE), STAGE IV (HCC): Primary | ICD-10-CM

## 2021-03-03 DIAGNOSIS — D63.8 ANEMIA OF CHRONIC DISEASE: ICD-10-CM

## 2021-03-03 PROCEDURE — G8417 CALC BMI ABV UP PARAM F/U: HCPCS | Performed by: INTERNAL MEDICINE

## 2021-03-03 PROCEDURE — 99213 OFFICE O/P EST LOW 20 MIN: CPT | Performed by: INTERNAL MEDICINE

## 2021-03-03 PROCEDURE — 1036F TOBACCO NON-USER: CPT | Performed by: INTERNAL MEDICINE

## 2021-03-03 PROCEDURE — 4040F PNEUMOC VAC/ADMIN/RCVD: CPT | Performed by: INTERNAL MEDICINE

## 2021-03-03 PROCEDURE — G8484 FLU IMMUNIZE NO ADMIN: HCPCS | Performed by: INTERNAL MEDICINE

## 2021-03-03 PROCEDURE — G8427 DOCREV CUR MEDS BY ELIG CLIN: HCPCS | Performed by: INTERNAL MEDICINE

## 2021-03-03 PROCEDURE — 1123F ACP DISCUSS/DSCN MKR DOCD: CPT | Performed by: INTERNAL MEDICINE

## 2021-03-03 RX ORDER — SODIUM BICARBONATE 325 MG/1
650 TABLET ORAL 2 TIMES DAILY
Qty: 180 TABLET | Refills: 3 | Status: SHIPPED | OUTPATIENT
Start: 2021-03-03 | End: 2022-02-07 | Stop reason: SDUPTHER

## 2021-03-03 NOTE — PROGRESS NOTES
Renal Progress Note    Assessment and Plan:      Diagnosis Orders   1. CKD (chronic kidney disease), stage IV ([address]     2. Essential hypertension     3. Persistent proteinuria     4. Anemia of chronic disease     5. Hyperparathyroidism, secondary renal ([address]     6. Vitamin D deficiency     7. Metabolic acidosis       PLAN:  I discussed my thoughts with the patient. He understood. Address his questions. Labs reviewed. Serum creatinine is increased to 4.1 mg/L from 3.5 mg/dL 6 months ago.   This may be due to recent episodes of hypotension with subsequent  decreased renal blood flow  I discussed that with him  Vitamin D level is normal  PTH is increased to 160 from 85.9  Medications reviewed  Discontinue lisinopril for now  Monitor blood pressure closely  Call if  systolic the top number is 150 or above or if the bottom number diastolic is  or above  Refills provided for sodium bicarbonate 650 mg twice a day for metabolic acidosis  Will not treat the high PTH for now  We will have him see Mrs. Chris Rajan CNP in [address] [place] [address] for education on options in advance chronic kidney disease  This was discussed with him and he was in agreement  Return visit 6 months with labs      Patient Active Problem List   Diagnosis    Chronic kidney disease (CKD), stage III (moderate)    Proteinuria    HTN (hypertension)    Gout    HLD (hyperlipidemia)    Prerenal azotemia    Vitamin D deficiency    Extensor tendon disruption    CKD (chronic kidney disease) stage 4, GFR 15-29 ml/min ([address]    GWEN (acute kidney injury) ([address]    Deep vein thrombosis (DVT) of right lower extremity ([address]    Metabolic acidemia    Metabolic acidosis    Hypernatremia    CKD (chronic kidney disease), stage IV ([address]    Hyperparathyroidism, secondary renal ([address]       Subjective:   Chief complaint:  Chief Complaint   Patient presents with    Chronic Kidney Disease     Stage V HPI:This is a follow up visit for Mr. Fanny Kuo who is here today for return appointment. I see him for chronic kidney disease. He was last seen about 6 months ago. Doing well since then with no new complaint. No new medications. He takes lisinopril 5 mg a day for high blood pressure but about 2 months ago his blood pressure was running low with systolic in the 57G and diastolic in the 74U. As a result the lisinopril was decreased to 2.5 mg a day. Blood pressure still doing good. Dima Petty No chest pain. No shortness of breath. No nausea vomiting. No fever or chills. No difficulties with urination. Appetite is good. No abdominal pain. ROS:  Pertinent positives stated above in HPI. All other systems were reviewed and were negative. Medications:     Current Outpatient Medications   Medication Sig Dispense Refill    sodium bicarbonate 325 MG tablet Take 325 mg by mouth daily       allopurinol (ZYLOPRIM) 300 MG tablet Take 150 mg by mouth daily      Cholecalciferol (VITAMIN D) 2000 units CAPS capsule Take 2,000 Units by mouth daily      lisinopril (PRINIVIL;ZESTRIL) 5 MG tablet take 1 tablet by mouth once daily  0    aspirin 81 MG tablet Take 81 mg by mouth daily.  Omega-3 Fatty Acids (FISH OIL) 1000 MG CAPS Take 3,000 mg by mouth daily        No current facility-administered medications for this visit.         Lab Results:    CBC:   Lab Results   Component Value Date    WBC 10.0 03/06/2017    HGB 14.6 08/27/2020    HCT 47.7 08/27/2020    MCV 94.7 (H) 03/06/2017     03/06/2017     BMP:    Lab Results   Component Value Date     02/24/2021     (H) 08/27/2020     02/26/2020    K 5.2 02/24/2021    K 4.9 08/27/2020    K 5.3 (H) 02/26/2020     (H) 02/24/2021     (H) 08/27/2020     (H) 02/26/2020    CO2 20 (L) 02/24/2021    CO2 15 (L) 08/27/2020    CO2 14 (L) 02/26/2020    BUN 62 (H) 02/24/2021    BUN 62 (H) 08/27/2020    BUN 77 (H) 02/26/2020

## 2021-09-02 ENCOUNTER — HOSPITAL ENCOUNTER (OUTPATIENT)
Age: 76
Discharge: HOME | End: 2021-09-02
Payer: MEDICARE

## 2021-09-02 DIAGNOSIS — N25.81 HYPERPARATHYROIDISM, SECONDARY RENAL (HCC): ICD-10-CM

## 2021-09-02 DIAGNOSIS — E55.9 VITAMIN D DEFICIENCY: ICD-10-CM

## 2021-09-02 DIAGNOSIS — D63.8 ANEMIA OF CHRONIC DISEASE: ICD-10-CM

## 2021-09-02 DIAGNOSIS — N18.4 CKD (CHRONIC KIDNEY DISEASE), STAGE IV (HCC): ICD-10-CM

## 2021-09-02 LAB
ANION GAP SERPL CALCULATED.3IONS-SCNC: 16 MEQ/L (ref 8–16)
BUN BLDV-MCNC: 65 MG/DL (ref 7–22)
CALCIUM SERPL-MCNC: 9.1 MG/DL (ref 8.5–10.5)
CHLORIDE BLD-SCNC: 111 MEQ/L (ref 98–111)
CO2: 16 MEQ/L (ref 23–33)
CREAT SERPL-MCNC: 4.7 MG/DL (ref 0.4–1.2)
FERRITIN: 288 NG/ML (ref 22–322)
GFR SERPL CREATININE-BSD FRML MDRD: 12 ML/MIN/1.73M2
GLUCOSE BLD-MCNC: 103 MG/DL (ref 70–108)
HCT VFR BLD CALC: 46.8 % (ref 42–52)
HEMOGLOBIN: 14.1 GM/DL (ref 14–18)
IRON SATURATION: 35 % (ref 20–50)
IRON: 75 UG/DL (ref 65–195)
POTASSIUM SERPL-SCNC: 5.2 MEQ/L (ref 3.5–5.2)
PTH INTACT: 93.2 PG/ML (ref 15–65)
SODIUM BLD-SCNC: 143 MEQ/L (ref 135–145)
TOTAL IRON BINDING CAPACITY: 212 UG/DL (ref 171–450)
VITAMIN D 25-HYDROXY: 43 NG/ML (ref 30–100)

## 2021-09-02 PROCEDURE — 36415 COLL VENOUS BLD VENIPUNCTURE: CPT

## 2021-09-02 PROCEDURE — 80048 BASIC METABOLIC PNL TOTAL CA: CPT

## 2021-09-02 PROCEDURE — 83540 ASSAY OF IRON: CPT

## 2021-09-02 PROCEDURE — 82306 VITAMIN D 25 HYDROXY: CPT

## 2021-09-02 PROCEDURE — 83970 ASSAY OF PARATHORMONE: CPT

## 2021-09-02 PROCEDURE — 85018 HEMOGLOBIN: CPT

## 2021-09-02 PROCEDURE — 82728 ASSAY OF FERRITIN: CPT

## 2021-09-02 PROCEDURE — 83550 IRON BINDING TEST: CPT

## 2021-09-02 PROCEDURE — 85014 HEMATOCRIT: CPT

## 2021-09-08 ENCOUNTER — OFFICE VISIT (OUTPATIENT)
Dept: NEPHROLOGY | Age: 76
End: 2021-09-08
Payer: MEDICARE

## 2021-09-08 VITALS
OXYGEN SATURATION: 98 % | BODY MASS INDEX: 25.81 KG/M2 | SYSTOLIC BLOOD PRESSURE: 121 MMHG | DIASTOLIC BLOOD PRESSURE: 90 MMHG | WEIGHT: 201 LBS | HEART RATE: 83 BPM

## 2021-09-08 DIAGNOSIS — N25.81 HYPERPARATHYROIDISM, SECONDARY RENAL (HCC): ICD-10-CM

## 2021-09-08 DIAGNOSIS — N18.5 CKD (CHRONIC KIDNEY DISEASE) STAGE 5, GFR LESS THAN 15 ML/MIN (HCC): ICD-10-CM

## 2021-09-08 DIAGNOSIS — I10 ESSENTIAL HYPERTENSION: ICD-10-CM

## 2021-09-08 DIAGNOSIS — R80.1 PERSISTENT PROTEINURIA: ICD-10-CM

## 2021-09-08 DIAGNOSIS — E87.20 METABOLIC ACIDOSIS: ICD-10-CM

## 2021-09-08 DIAGNOSIS — E55.9 VITAMIN D DEFICIENCY: ICD-10-CM

## 2021-09-08 DIAGNOSIS — D63.8 ANEMIA OF CHRONIC DISEASE: ICD-10-CM

## 2021-09-08 DIAGNOSIS — N17.9 AKI (ACUTE KIDNEY INJURY) (HCC): Primary | ICD-10-CM

## 2021-09-08 PROCEDURE — G8427 DOCREV CUR MEDS BY ELIG CLIN: HCPCS | Performed by: INTERNAL MEDICINE

## 2021-09-08 PROCEDURE — G8417 CALC BMI ABV UP PARAM F/U: HCPCS | Performed by: INTERNAL MEDICINE

## 2021-09-08 PROCEDURE — 99213 OFFICE O/P EST LOW 20 MIN: CPT | Performed by: INTERNAL MEDICINE

## 2021-09-08 PROCEDURE — 4040F PNEUMOC VAC/ADMIN/RCVD: CPT | Performed by: INTERNAL MEDICINE

## 2021-09-08 PROCEDURE — 1123F ACP DISCUSS/DSCN MKR DOCD: CPT | Performed by: INTERNAL MEDICINE

## 2021-09-08 PROCEDURE — 1036F TOBACCO NON-USER: CPT | Performed by: INTERNAL MEDICINE

## 2021-09-08 RX ORDER — LISINOPRIL 5 MG/1
2.5 TABLET ORAL DAILY
COMMUNITY
Start: 2021-09-07 | End: 2021-09-08 | Stop reason: ALTCHOICE

## 2021-09-08 NOTE — PROGRESS NOTES
Renal Progress Note    Assessment and Plan:      Diagnosis Orders   1. GWEN (acute kidney injury) ([address]     2. CKD (chronic kidney disease) stage 5, GFR less than 15 ml/min ([facility]     3. Essential hypertension     4. Persistent proteinuria     5. Hyperparathyroidism, secondary renal ([address]     6. Vitamin D deficiency     7. Metabolic acidosis     8. Anemia of chronic disease       PLAN:  I discussed my thoughts with the patient. He understood. Lab results are reviewed with him in epic. I addressed his questions. Serum creatinine is increased to 4.7 mg/dL from 4.1 up visit. Serum bicarbonate is decreased to 16 mEq/L from 20 mEq/L. This is due to him not taking his sodium bicarbonate  as prescribed. He is agreeing to take at least 2 tablets a day now but not the 4 tablets that was prescribed. I discussed the possibility of renal placement therapy in the future. He will do it if need be according to him. However he declined education on options in End-stage kidney disease unless it is done here in [facility]. He will not go to [place]. I would like to see him in 3 months. He is declining as well, will only come in 6 months. Patient Active Problem List   Diagnosis    Chronic kidney disease (CKD), stage III (moderate) ([facility]    Proteinuria    HTN (hypertension)    Gout    HLD (hyperlipidemia)    Prerenal azotemia    Vitamin D deficiency    Extensor tendon disruption    CKD (chronic kidney disease) stage 4, GFR 15-29 ml/min ([facility]    GWEN (acute kidney injury) ([address]    Deep vein thrombosis (DVT) of right lower extremity ([facility])    Metabolic acidemia    Metabolic acidosis    Hypernatremia    CKD (chronic kidney disease), stage IV ([address]    Hyperparathyroidism, secondary renal ([address]       PLAN:      Subjective:   Chief complaint:  Chief Complaint   Patient presents with    Chronic Kidney Disease     Stage V      HPI:This is a follow up visit for  Valerio Gaston who is here today for return appointment. Hepatic:   Lab Results   Component Value Date    AST 13 05/07/2018    AST 18 11/16/2016    AST 20 11/07/2016    ALT 12 05/07/2018    ALT 14 11/16/2016    ALT 20 11/07/2016    BILITOT 0.5 05/07/2018    BILITOT 0.5 11/16/2016    BILITOT 0.3 11/07/2016    ALKPHOS 74 05/07/2018    ALKPHOS 84 11/16/2016    ALKPHOS 80 11/07/2016     BNP: No results found for: BNP  Lipids:   Lab Results   Component Value Date    CHOL 193 02/24/2021    HDL 28 02/24/2021     INR: No results found for: INR  URINE:   Lab Results   Component Value Date    PROTUR 60.8 08/27/2019     No results found for: Jerome Mer, PHUR, LABCAST, WBCUA, RBCUA, MUCUS, TRICHOMONAS, YEAST, BACTERIA, CLARITYU, SPECGRAV, LEUKOCYTESUR, UROBILINOGEN, BILIRUBINUR, BLOODU, GLUCOSEU, KETUA, AMORPHOUS   Microalbumen/Creatinine ratio:  No components found for: RUCREAT    Objective:   Vitals: BP (!) 121/90 (Site: Left Upper Arm, Position: Sitting, Cuff Size: Large Adult)   Pulse 83   Wt 201 lb (91.2 kg)   SpO2 98%   BMI 25.81 kg/m²      Constitutional:  Alert, awake, no apparent distress  Skin:normal with no rash or any lesions  HEENT:Pupils are reactive . Throat is clear. Oral mucosa is moist.  Neck:supple with no thyromegaly or bruit   Cardiovascular:  S1, S2 without murmur   Respiratory:  Clear to auscultation with no wheezes or rales  Abdomen: +bowel sound, soft, non tender and no bruit  Ext: Trace bilateral LE edema  Musculoskeletal:Intact  Neuro:Alert, awake and oriented with no obvious focal deficit. Speech is normal.    Electronically signed by Amanda Richey MD on 9/8/2021 at 9:58 AM   **This report has been created using voice recognition software. It maycontain minor  errors which are inherent in voice recognition technology. **

## 2022-02-07 RX ORDER — SODIUM BICARBONATE 325 MG/1
325 TABLET ORAL DAILY
Qty: 30 TABLET | Refills: 3 | Status: SHIPPED | OUTPATIENT
Start: 2022-02-07 | End: 2022-03-09

## 2022-03-03 ENCOUNTER — HOSPITAL ENCOUNTER (OUTPATIENT)
Age: 77
Discharge: HOME | End: 2022-03-03
Payer: MEDICARE

## 2022-03-03 DIAGNOSIS — D63.8 ANEMIA OF CHRONIC DISEASE: ICD-10-CM

## 2022-03-03 DIAGNOSIS — N25.81 HYPERPARATHYROIDISM, SECONDARY RENAL (HCC): ICD-10-CM

## 2022-03-03 DIAGNOSIS — N18.5 CKD (CHRONIC KIDNEY DISEASE) STAGE 5, GFR LESS THAN 15 ML/MIN (HCC): ICD-10-CM

## 2022-03-03 DIAGNOSIS — N17.9 AKI (ACUTE KIDNEY INJURY) (HCC): ICD-10-CM

## 2022-03-03 DIAGNOSIS — E55.9 VITAMIN D DEFICIENCY: ICD-10-CM

## 2022-03-03 LAB
ANION GAP SERPL CALCULATED.3IONS-SCNC: 20 MEQ/L (ref 8–16)
BUN BLDV-MCNC: 92 MG/DL (ref 7–22)
CALCIUM SERPL-MCNC: 9.1 MG/DL (ref 8.5–10.5)
CHLORIDE BLD-SCNC: 110 MEQ/L (ref 98–111)
CO2: 15 MEQ/L (ref 23–33)
CREAT SERPL-MCNC: 7.3 MG/DL (ref 0.4–1.2)
FERRITIN: 210 NG/ML (ref 22–322)
GFR SERPL CREATININE-BSD FRML MDRD: 7 ML/MIN/1.73M2
GLUCOSE BLD-MCNC: 104 MG/DL (ref 70–108)
HCT VFR BLD CALC: 49.3 % (ref 42–52)
HEMOGLOBIN: 15.5 GM/DL (ref 14–18)
IRON SATURATION: 37 % (ref 20–50)
IRON: 84 UG/DL (ref 65–195)
POTASSIUM SERPL-SCNC: 4.1 MEQ/L (ref 3.5–5.2)
PTH INTACT: 176.2 PG/ML (ref 15–65)
SODIUM BLD-SCNC: 145 MEQ/L (ref 135–145)
TOTAL IRON BINDING CAPACITY: 229 UG/DL (ref 171–450)
VITAMIN D 25-HYDROXY: 34 NG/ML (ref 30–100)

## 2022-03-03 PROCEDURE — 83550 IRON BINDING TEST: CPT

## 2022-03-03 PROCEDURE — 83540 ASSAY OF IRON: CPT

## 2022-03-03 PROCEDURE — 36415 COLL VENOUS BLD VENIPUNCTURE: CPT

## 2022-03-03 PROCEDURE — 82306 VITAMIN D 25 HYDROXY: CPT

## 2022-03-03 PROCEDURE — 80048 BASIC METABOLIC PNL TOTAL CA: CPT

## 2022-03-03 PROCEDURE — 85018 HEMOGLOBIN: CPT

## 2022-03-03 PROCEDURE — 83970 ASSAY OF PARATHORMONE: CPT

## 2022-03-03 PROCEDURE — 85014 HEMATOCRIT: CPT

## 2022-03-03 PROCEDURE — 82728 ASSAY OF FERRITIN: CPT

## 2022-03-04 ENCOUNTER — TELEPHONE (OUTPATIENT)
Dept: NEPHROLOGY | Age: 77
End: 2022-03-04

## 2022-03-04 NOTE — TELEPHONE ENCOUNTER
I called pt in regards to going to the ER. He states he just passed a kidney stone a couple of days ago. He feels fine. He does not want to go to the ER. I advised that he may have some blockage due to the stone. He still did not want to go.

## 2022-03-09 ENCOUNTER — OFFICE VISIT (OUTPATIENT)
Dept: NEPHROLOGY | Age: 77
End: 2022-03-09
Payer: MEDICARE

## 2022-03-09 VITALS
DIASTOLIC BLOOD PRESSURE: 96 MMHG | HEART RATE: 95 BPM | OXYGEN SATURATION: 97 % | BODY MASS INDEX: 26.58 KG/M2 | SYSTOLIC BLOOD PRESSURE: 158 MMHG | WEIGHT: 207 LBS

## 2022-03-09 DIAGNOSIS — I10 ESSENTIAL HYPERTENSION: ICD-10-CM

## 2022-03-09 DIAGNOSIS — E87.20 METABOLIC ACIDOSIS: ICD-10-CM

## 2022-03-09 DIAGNOSIS — E55.9 VITAMIN D DEFICIENCY: ICD-10-CM

## 2022-03-09 DIAGNOSIS — N18.5 CKD (CHRONIC KIDNEY DISEASE) STAGE 5, GFR LESS THAN 15 ML/MIN (HCC): Primary | ICD-10-CM

## 2022-03-09 DIAGNOSIS — D63.8 ANEMIA OF CHRONIC DISEASE: ICD-10-CM

## 2022-03-09 DIAGNOSIS — N25.81 HYPERPARATHYROIDISM, SECONDARY RENAL (HCC): ICD-10-CM

## 2022-03-09 DIAGNOSIS — R80.1 PERSISTENT PROTEINURIA: ICD-10-CM

## 2022-03-09 PROCEDURE — G8427 DOCREV CUR MEDS BY ELIG CLIN: HCPCS | Performed by: INTERNAL MEDICINE

## 2022-03-09 PROCEDURE — G8417 CALC BMI ABV UP PARAM F/U: HCPCS | Performed by: INTERNAL MEDICINE

## 2022-03-09 PROCEDURE — 1036F TOBACCO NON-USER: CPT | Performed by: INTERNAL MEDICINE

## 2022-03-09 PROCEDURE — 99213 OFFICE O/P EST LOW 20 MIN: CPT | Performed by: INTERNAL MEDICINE

## 2022-03-09 PROCEDURE — 1123F ACP DISCUSS/DSCN MKR DOCD: CPT | Performed by: INTERNAL MEDICINE

## 2022-03-09 PROCEDURE — 4040F PNEUMOC VAC/ADMIN/RCVD: CPT | Performed by: INTERNAL MEDICINE

## 2022-03-09 PROCEDURE — G8484 FLU IMMUNIZE NO ADMIN: HCPCS | Performed by: INTERNAL MEDICINE

## 2022-03-09 RX ORDER — SODIUM BICARBONATE 650 MG/1
650 TABLET ORAL 3 TIMES DAILY
Qty: 270 TABLET | Refills: 1 | Status: SHIPPED | OUTPATIENT
Start: 2022-03-09 | End: 2022-09-07

## 2022-03-09 RX ORDER — SODIUM BICARBONATE 650 MG/1
650 TABLET ORAL 3 TIMES DAILY
COMMUNITY
End: 2022-03-09 | Stop reason: SDUPTHER

## 2022-03-09 NOTE — PATIENT INSTRUCTIONS
Please proceed to the hospital for evaluation if you develop nausea, vomiting, poor appetite or easy fatigue. Those will be the symptoms of kidney failure.

## 2022-03-09 NOTE — PROGRESS NOTES
Renal Progress Note    Assessment and Plan:      Diagnosis Orders   1. CKD (chronic kidney disease) stage 5, GFR less than 15 ml/min ([facility])     2. Persistent proteinuria     3. Hyperparathyroidism, secondary renal ([address]     4. Vitamin D deficiency     5. Essential hypertension     6. Metabolic acidosis     7. Anemia of chronic disease         PLAN:  1. Lab result discussed with patient. 2. He understood. 3. I addressed his questions  4. We reviewed the lab report together in epic  5. Serum creatinine is increased to 7.3 mg/dL from 4.7 mg/dL. 6. Since the patient had recently passed a kidney stone according to him, possibility of obstructive uropathy is considered. 7. I asked him to repeat the blood work today for BMP and maybe kidney ultrasound for possible for obstructive uropathy  8. He strongly declined  9. He needs to be seen back soon like 4 weeks but he again declined  10. He will only come back in 6 months stating he is feeling great with the best appetite ever according to him. 11. Serum bicarbonate is decreased to 15 mEq/L from 16 mEq/L 6 months ago. 12. Medications reviewed  13. Sodium bicarbonate 650 mg 3 times a day  14. Return return 6 months according to the patient's wishes though I do not think that is the right thing to do  15. I instructed him to go to the hospital if he begins to feel sick like nausea, vomiting, poor appetite and weakness.   12. Printed instruction provided to him          Patient Active Problem List   Diagnosis    Chronic kidney disease (CKD), stage III (moderate) ([facility])    Proteinuria    HTN (hypertension)    Gout    HLD (hyperlipidemia)    Prerenal azotemia    Vitamin D deficiency    Extensor tendon disruption    CKD (chronic kidney disease) stage 4, GFR 15-29 ml/min ([facility])    GWEN (acute kidney injury) ([address]    Deep vein thrombosis (DVT) of right lower extremity ([facility])    Metabolic acidemia    Metabolic acidosis    Hypernatremia    [facility]    Hyperparathyroidism, secondary renal [facility]           Subjective:   Chief complaint:  Chief Complaint   Patient presents with    Chronic Kidney Disease     Stage IV      HPI:This is a follow up visit for Mr. Nguyen Clay who is here today for return appointment. I see him for chronic kidney disease. Was last seen about 6 months ago. No complaint. No chest pain. No shortness of breath. His serum creatinine was 4.7 mg/dL last appointment. It has increased to 7.3 mg/dl. Cecelia Bloom He recently passed  kidney stone. ROS:  Pertinent positives stated above in HPI. All other systems were reviewed and were negative. Medications:     Current Outpatient Medications   Medication Sig Dispense Refill    sodium bicarbonate 650 MG tablet Take 650 mg by mouth 3 times daily      allopurinol (ZYLOPRIM) 300 MG tablet Take 150 mg by mouth daily      Cholecalciferol (VITAMIN D) 2000 units CAPS capsule Take 2,000 Units by mouth daily      aspirin 81 MG tablet Take 81 mg by mouth daily.  Omega-3 Fatty Acids (FISH OIL) 1000 MG CAPS Take 3,000 mg by mouth daily        No current facility-administered medications for this visit.        Lab Results:    CBC:   Lab Results   Component Value Date    WBC 10.0 03/06/2017    HGB 15.5 03/03/2022    HCT 49.3 03/03/2022    MCV 94.7 (H) 03/06/2017     03/06/2017     BMP:    Lab Results   Component Value Date     03/03/2022     09/02/2021     02/24/2021    K 4.1 03/03/2022    K 5.2 09/02/2021    K 5.2 02/24/2021     03/03/2022     09/02/2021     (H) 02/24/2021    CO2 15 (L) 03/03/2022    CO2 16 (L) 09/02/2021    CO2 20 (L) 02/24/2021    BUN 92 (H) 03/03/2022    BUN 65 (H) 09/02/2021    BUN 62 (H) 02/24/2021    CREATININE 7.3 (HH) 03/03/2022    CREATININE 4.7 (HH) 09/02/2021    CREATININE 4.1 (HH) 02/24/2021    GLUCOSE 104 03/03/2022    GLUCOSE 103 09/02/2021    GLUCOSE 98 02/24/2021      Hepatic:   Lab Results   Component Value Date AST 13 05/07/2018    AST 18 11/16/2016    AST 20 11/07/2016    ALT 12 05/07/2018    ALT 14 11/16/2016    ALT 20 11/07/2016    BILITOT 0.5 05/07/2018    BILITOT 0.5 11/16/2016    BILITOT 0.3 11/07/2016    ALKPHOS 74 05/07/2018    ALKPHOS 84 11/16/2016    ALKPHOS 80 11/07/2016     BNP: No results found for: BNP  Lipids:   Lab Results   Component Value Date    CHOL 193 02/24/2021    HDL 28 02/24/2021     INR: No results found for: INR  URINE:   Lab Results   Component Value Date    PROTUR 60.8 08/27/2019     No results found for: Gaylin Spruce, PHUR, LABCAST, WBCUA, RBCUA, MUCUS, TRICHOMONAS, YEAST, BACTERIA, CLARITYU, SPECGRAV, LEUKOCYTESUR, UROBILINOGEN, BILIRUBINUR, BLOODU, GLUCOSEU, KETUA, AMORPHOUS   Microalbumen/Creatinine ratio:  No components found for: RUCREAT    Objective:   Vitals: BP (!) 158/96 (Site: Left Upper Arm, Position: Sitting, Cuff Size: Large Adult)   Pulse 95   Wt 207 lb (93.9 kg)   SpO2 97%   BMI 26.58 kg/m²      Constitutional:  Alert, awake, no apparent distress  Skin:normal with no rash or any significant lesions  HEENT:Pupils are reactive . Throat is clear. Oral mucosa is moist.  Neck:supple with no thyromegaly, JVD, lymphadenopathy or bruit   Cardiovascular: Regular sinus rhythm without murmur, rubs or gallops   Respiratory:  Clear to auscultation with no wheezes or rales  Abdomen: Good bowel sound, soft, non tender and no bruit  Ext: No LE edema  Musculoskeletal:Intact  Neuro:Alert, awake and oriented with no obvious focal deficit. Speech is normal    Electronically signed by Lonnie Garcia MD on 3/9/2022 at 9:59 AM   **This report has been created using voice recognition software. It maycontain minor  errors which are inherent in voice recognition technology. **

## 2022-08-21 ENCOUNTER — HOSPITAL ENCOUNTER (EMERGENCY)
Age: 77
Discharge: HOME | End: 2022-08-21
Attending: EMERGENCY MEDICINE
Payer: MEDICARE

## 2022-08-21 VITALS
TEMPERATURE: 97 F | SYSTOLIC BLOOD PRESSURE: 175 MMHG | DIASTOLIC BLOOD PRESSURE: 97 MMHG | HEART RATE: 89 BPM | RESPIRATION RATE: 20 BRPM | OXYGEN SATURATION: 96 %

## 2022-08-21 DIAGNOSIS — H65.00 ACUTE SEROUS OTITIS MEDIA, RECURRENCE NOT SPECIFIED, UNSPECIFIED LATERALITY: Primary | ICD-10-CM

## 2022-08-21 PROCEDURE — 99283 EMERGENCY DEPT VISIT LOW MDM: CPT

## 2022-08-21 RX ORDER — FLUTICASONE PROPIONATE 50 MCG
1 SPRAY, SUSPENSION (ML) NASAL 2 TIMES DAILY
Qty: 16 G | Refills: 0 | Status: SHIPPED | OUTPATIENT
Start: 2022-08-21

## 2022-08-21 RX ORDER — AZITHROMYCIN 250 MG/1
TABLET, FILM COATED ORAL
Qty: 1 PACKET | Refills: 0 | Status: SHIPPED | OUTPATIENT
Start: 2022-08-21 | End: 2022-08-25

## 2022-08-21 ASSESSMENT — PAIN - FUNCTIONAL ASSESSMENT
PAIN_FUNCTIONAL_ASSESSMENT: NONE - DENIES PAIN
PAIN_FUNCTIONAL_ASSESSMENT: NONE - DENIES PAIN

## 2022-08-21 ASSESSMENT — ENCOUNTER SYMPTOMS
SORE THROAT: 0
WHEEZING: 0
BLURRED VISION: 0
SINUS PAIN: 0
NAUSEA: 0
ABDOMINAL PAIN: 0
COUGH: 0
SHORTNESS OF BREATH: 0

## 2022-08-21 NOTE — ED PROVIDER NOTES
[facility]  eMERGENCY dEPARTMENT eNCOUnter             Obi Bloom 19 COMPLAINT    Chief Complaint   Patient presents with    URI    Ear Fullness       Nurses Notes reviewed and I agree except as noted in the HPI. HPI    Zahida Kruger is a 65 y.o. male who presents both of his ears feel plugged and he cannot hear very well at all. He is wearing hearing aid in his right ear. Yawning and Valsalva maneuvers do not seem to help. He has not tried any medication for it. This is happened to him before, and his doctor gave him antibiotics at work. He has some mild nasal congestion, no sinus pain, minimal cough, no chest congestion. He denies any ear pain. REVIEW OF SYSTEMS      Review of Systems   Constitutional:  Negative for fever and malaise/fatigue. HENT:  Positive for congestion and hearing loss. Negative for ear pain, sinus pain, sore throat and tinnitus. Eyes:  Negative for blurred vision. Respiratory:  Negative for cough, shortness of breath and wheezing. Cardiovascular:  Negative for chest pain and palpitations. Gastrointestinal:  Negative for abdominal pain and nausea. Neurological:  Negative for dizziness, focal weakness and headaches. All other systems reviewed and are negative. PAST MEDICAL HISTORY     has a past medical history of Chronic kidney disease, Erythrocytosis, Gout, Hyperlipidemia, Hypertension, Prerenal azotemia, Proteinuria, and Vitamin D deficiency. SURGICAL HISTORY     has a past surgical history that includes back surgery; Abdomen surgery; skin biopsy; eye surgery; other surgical history (Left, 5/20/2015); and Colon surgery.     CURRENT MEDICATIONS    Discharge Medication List as of 8/21/2022 12:31 PM        CONTINUE these medications which have NOT CHANGED    Details   sodium bicarbonate 650 MG tablet Take 1 tablet by mouth 3 times daily, Disp-270 tablet, R-1Normal      allopurinol (ZYLOPRIM) 300 MG tablet Take 150 mg by mouth dailyHistorical Med      Cholecalciferol (VITAMIN D) 2000 units CAPS capsule Take 2,000 Units by mouth dailyHistorical Med      aspirin 81 MG tablet Take 81 mg by mouth daily. Omega-3 Fatty Acids (FISH OIL) 1000 MG CAPS Take 3,000 mg by mouth daily              ALLERGIES    has No Known Allergies. FAMILY HISTORY    He indicated that his mother is . He indicated that his father is . He indicated that the status of his brother is unknown.   family history includes Cancer in his father; High Blood Pressure in his brother; Kidney Disease in his brother. SOCIAL HISTORY     reports that he quit smoking about 41 years ago. His smoking use included cigarettes. He smoked an average of 1 pack per day. He has never used smokeless tobacco. He reports current alcohol use of about 14.0 standard drinks per week. He reports that he does not use drugs. PHYSICAL EXAM       INITIAL VITALS: BP (!) 175/97   Pulse 89   Temp 97 °F (36.1 °C) (Temporal)   Resp 20   SpO2 96%        Physical Exam  Vitals and nursing note reviewed. Exam conducted with a chaperone present. Constitutional:       General: He is not in acute distress. Appearance: He is not ill-appearing. HENT:      Right Ear: Ear canal normal.      Left Ear: Ear canal normal.      Ears:      Comments: Bubbly fluid behind TM's bilaterally. Nose: Congestion present. No rhinorrhea. Mouth/Throat:      Mouth: Mucous membranes are moist.      Pharynx: No oropharyngeal exudate or posterior oropharyngeal erythema. Eyes:      Conjunctiva/sclera: Conjunctivae normal.      Pupils: Pupils are equal, round, and reactive to light. Cardiovascular:      Rate and Rhythm: Normal rate and regular rhythm. Heart sounds: No murmur heard. Pulmonary:      Effort: Pulmonary effort is normal. No respiratory distress. Breath sounds: Normal breath sounds. No wheezing. Musculoskeletal:      Cervical back: Neck supple. Lymphadenopathy:      Cervical: No cervical adenopathy. Skin:     General: Skin is warm and dry. Neurological:      General: No focal deficit present. Mental Status: He is alert and oriented to person, place, and time. Psychiatric:         Behavior: Behavior normal.           Vitals:    Vitals:    08/21/22 1204   BP: (!) 175/97   Pulse: 89   Resp: 20   Temp: 97 °F (36.1 °C)   TempSrc: Temporal   SpO2: 96%       EMERGENCY DEPARTMENT COURSE:    General measures for serous otitis discussed. He may benefit from ENT evaluation if symptoms are not resolving. FINAL IMPRESSION      1.  Acute serous otitis media, recurrence not specified, unspecified laterality        DISPOSITION/PLAN    DISPOSITION Decision To Discharge 08/21/2022 12:28:06 PM      PATIENT REFERRED TO:    Lauro Dejesus MD  [address] [place] [address],[address] [phone]      As needed    DISCHARGE MEDICATIONS:    Discharge Medication List as of 8/21/2022 12:31 PM        START taking these medications    Details   azithromycin (ZITHROMAX Z-YECENIA) 250 MG tablet Take 2 tablets (500 mg) on Day 1, and then take 1 tablet (250 mg) on days 2 through 5., Disp-1 packet, R-0Normal      fluticasone (FLONASE) 50 MCG/ACT nasal spray 1 spray by Each Nostril route 2 times daily, Disp-16 g, R-0Normal                (Please note that portions of this note were completed with a voice recognition program.  Efforts were made to edit the dictations but occasionally words are mis-transcribed.)       Marin Geronmio MD  08/21/22 1942

## 2022-08-21 NOTE — ED NOTES
Discharge instructions reviewed with patient and questions answered. Skin warm and dry, color normal for ethnicity. Remains alert and cooperative. Denies further questions or concerns at this time.       Angie Ralph RN  08/21/22 1160

## 2022-08-21 NOTE — DISCHARGE INSTRUCTIONS
Antibiotic and nose spray as described. Try to get your ears to \"pop \"by yawning, and by holding your nose and \"bearing down \".   Talk to your doctor about seeing an ear specialist if symptoms persist.

## 2022-09-02 ENCOUNTER — HOSPITAL ENCOUNTER (OUTPATIENT)
Age: 77
Discharge: HOME | End: 2022-09-02
Payer: MEDICARE

## 2022-09-02 DIAGNOSIS — D63.8 ANEMIA OF CHRONIC DISEASE: ICD-10-CM

## 2022-09-02 DIAGNOSIS — N25.81 HYPERPARATHYROIDISM, SECONDARY RENAL (HCC): ICD-10-CM

## 2022-09-02 DIAGNOSIS — E55.9 VITAMIN D DEFICIENCY: ICD-10-CM

## 2022-09-02 DIAGNOSIS — N18.5 CKD (CHRONIC KIDNEY DISEASE) STAGE 5, GFR LESS THAN 15 ML/MIN (HCC): ICD-10-CM

## 2022-09-02 LAB
ANION GAP SERPL CALCULATED.3IONS-SCNC: 19 MEQ/L (ref 8–16)
BASOPHILS # BLD: 0.6 %
BASOPHILS ABSOLUTE: 0 THOU/MM3 (ref 0–0.1)
BUN BLDV-MCNC: 95 MG/DL (ref 7–22)
CALCIUM SERPL-MCNC: 8.8 MG/DL (ref 8.5–10.5)
CHLORIDE BLD-SCNC: 108 MEQ/L (ref 98–111)
CHOLESTEROL, TOTAL: 200 MG/DL (ref 100–199)
CO2: 16 MEQ/L (ref 23–33)
CREAT SERPL-MCNC: 7.3 MG/DL (ref 0.4–1.2)
EOSINOPHIL # BLD: 4.6 %
EOSINOPHILS ABSOLUTE: 0.4 THOU/MM3 (ref 0–0.4)
ERYTHROCYTE [DISTWIDTH] IN BLOOD BY AUTOMATED COUNT: 15.2 % (ref 11.5–14.5)
ERYTHROCYTE [DISTWIDTH] IN BLOOD BY AUTOMATED COUNT: 54.3 FL (ref 35–45)
FERRITIN: 429 NG/ML (ref 22–322)
GFR SERPL CREATININE-BSD FRML MDRD: 7 ML/MIN/1.73M2
GLUCOSE BLD-MCNC: 101 MG/DL (ref 70–108)
HCT VFR BLD CALC: 42.5 % (ref 42–52)
HDLC SERPL-MCNC: 28 MG/DL
HEMOGLOBIN: 13.1 GM/DL (ref 14–18)
IMMATURE GRANS (ABS): 0.03 THOU/MM3 (ref 0–0.07)
IMMATURE GRANULOCYTES: 0.4 %
IRON SATURATION: 18 % (ref 20–50)
IRON: 36 UG/DL (ref 65–195)
LDL CHOLESTEROL CALCULATED: 126 MG/DL
LYMPHOCYTES # BLD: 22.2 %
LYMPHOCYTES ABSOLUTE: 1.7 THOU/MM3 (ref 1–4.8)
MCH RBC QN AUTO: 29.8 PG (ref 26–33)
MCHC RBC AUTO-ENTMCNC: 30.8 GM/DL (ref 32.2–35.5)
MCV RBC AUTO: 96.8 FL (ref 80–94)
MONOCYTES # BLD: 9.4 %
MONOCYTES ABSOLUTE: 0.7 THOU/MM3 (ref 0.4–1.3)
NUCLEATED RED BLOOD CELLS: 0 /100 WBC
PLATELET # BLD: 186 THOU/MM3 (ref 130–400)
PMV BLD AUTO: 11.2 FL (ref 9.4–12.4)
POTASSIUM SERPL-SCNC: 4.8 MEQ/L (ref 3.5–5.2)
PTH INTACT: 281.8 PG/ML (ref 15–65)
RBC # BLD: 4.39 MILL/MM3 (ref 4.7–6.1)
SEG NEUTROPHILS: 62.8 %
SEGMENTED NEUTROPHILS ABSOLUTE COUNT: 4.9 THOU/MM3 (ref 1.8–7.7)
SODIUM BLD-SCNC: 143 MEQ/L (ref 135–145)
TOTAL IRON BINDING CAPACITY: 201 UG/DL (ref 171–450)
TRIGL SERPL-MCNC: 228 MG/DL (ref 0–199)
URIC ACID: 8.7 MG/DL (ref 3.7–7)
VITAMIN D 25-HYDROXY: 33 NG/ML (ref 30–100)
WBC # BLD: 7.8 THOU/MM3 (ref 4.8–10.8)

## 2022-09-02 PROCEDURE — 83540 ASSAY OF IRON: CPT

## 2022-09-02 PROCEDURE — 82306 VITAMIN D 25 HYDROXY: CPT

## 2022-09-02 PROCEDURE — 85025 COMPLETE CBC W/AUTO DIFF WBC: CPT

## 2022-09-02 PROCEDURE — 84550 ASSAY OF BLOOD/URIC ACID: CPT

## 2022-09-02 PROCEDURE — 82728 ASSAY OF FERRITIN: CPT

## 2022-09-02 PROCEDURE — 36415 COLL VENOUS BLD VENIPUNCTURE: CPT

## 2022-09-02 PROCEDURE — 80048 BASIC METABOLIC PNL TOTAL CA: CPT

## 2022-09-02 PROCEDURE — 83970 ASSAY OF PARATHORMONE: CPT

## 2022-09-02 PROCEDURE — 80061 LIPID PANEL: CPT

## 2022-09-02 PROCEDURE — 83550 IRON BINDING TEST: CPT

## 2022-09-07 ENCOUNTER — OFFICE VISIT (OUTPATIENT)
Dept: NEPHROLOGY | Age: 77
End: 2022-09-07
Payer: MEDICARE

## 2022-09-07 VITALS
OXYGEN SATURATION: 96 % | SYSTOLIC BLOOD PRESSURE: 155 MMHG | DIASTOLIC BLOOD PRESSURE: 98 MMHG | HEART RATE: 86 BPM | WEIGHT: 197 LBS | BODY MASS INDEX: 25.29 KG/M2

## 2022-09-07 DIAGNOSIS — D63.8 ANEMIA OF CHRONIC DISEASE: ICD-10-CM

## 2022-09-07 DIAGNOSIS — E79.0 HYPERURICEMIA: ICD-10-CM

## 2022-09-07 DIAGNOSIS — E87.20 METABOLIC ACIDOSIS: ICD-10-CM

## 2022-09-07 DIAGNOSIS — E78.5 DYSLIPIDEMIA: ICD-10-CM

## 2022-09-07 DIAGNOSIS — N18.5 CKD (CHRONIC KIDNEY DISEASE) STAGE 5, GFR LESS THAN 15 ML/MIN (HCC): Primary | ICD-10-CM

## 2022-09-07 PROCEDURE — 99214 OFFICE O/P EST MOD 30 MIN: CPT | Performed by: INTERNAL MEDICINE

## 2022-09-07 PROCEDURE — 1123F ACP DISCUSS/DSCN MKR DOCD: CPT | Performed by: INTERNAL MEDICINE

## 2022-09-07 PROCEDURE — 1036F TOBACCO NON-USER: CPT | Performed by: INTERNAL MEDICINE

## 2022-09-07 PROCEDURE — G8427 DOCREV CUR MEDS BY ELIG CLIN: HCPCS | Performed by: INTERNAL MEDICINE

## 2022-09-07 PROCEDURE — G8417 CALC BMI ABV UP PARAM F/U: HCPCS | Performed by: INTERNAL MEDICINE

## 2022-09-07 RX ORDER — SODIUM BICARBONATE 650 MG/1
1300 TABLET ORAL 3 TIMES DAILY
Qty: 270 TABLET | Refills: 1 | Status: SHIPPED | OUTPATIENT
Start: 2022-09-07

## 2022-09-07 RX ORDER — CETIRIZINE HYDROCHLORIDE 10 MG/1
TABLET ORAL
COMMUNITY
Start: 2022-09-02

## 2022-09-07 RX ORDER — LISINOPRIL 10 MG/1
TABLET ORAL
COMMUNITY
Start: 2022-09-02

## 2022-09-07 NOTE — PROGRESS NOTES
Renal Progress Note    Assessment and Plan:      Diagnosis Orders   1. CKD (chronic kidney disease) stage 5, GFR less than 15 ml/min (HCC)        2. Metabolic acidosis        3. Hyperuricemia        4. Dyslipidemia        5. Anemia of chronic disease                  PLAN:  I discussed my thoughts at length with patient and spouse. They understood. I addressed their questions. Lab results reviewed with them together in epic. Serum creatinine is stable at 7.3 mg/dL. Serum bicarbonate remains low at 16 mEq/L. Uric acid level is high at 8.7. Medications reviewed  Allopurinol 100 mg once a day for hyperuricemia  Increase the sodium bicarbonate from 650 mg 3 times a day to 1300 mg 3 times a day  I would like to add  doxazosin to his blood pressure medication but he declined  I also would like to see him in 3 month but he wants to come in 6 months only          Patient Active Problem List   Diagnosis    Proteinuria    HTN (hypertension)    Gout    HLD (hyperlipidemia)    Prerenal azotemia    Vitamin D deficiency    CKD (chronic kidney disease) stage 4, GFR 15-29 ml/min (HCC)    Metabolic acidemia    Metabolic acidosis    Hypernatremia    CKD (chronic kidney disease), stage IV ([address]    Hyperparathyroidism, secondary renal ([address]           Subjective:   Chief complaint:  Chief Complaint   Patient presents with    Chronic Kidney Disease     Stage V      HPI:This is a follow up visit for Mr. Kwan Jimenez  here today for an appointment. I see him for chronic kidney disease. Was last seen about 6 months ago. Reynold Johnson His blood pressure remains high at home with systolic in the 816O . His urine output is also slowing down. Denies any chest pain. No shortness of breath. Appetite is good. No nausea or vomiting. He was started on a new blood pressure medicine by his primary care provider according to him. ROS:  Pertinent positives stated above in HPI. All other systems were reviewed and were negative.   Medications: Current Outpatient Medications   Medication Sig Dispense Refill    cetirizine (ZYRTEC) 10 MG tablet take 1 tablet by mouth once daily if needed for allergies      fluticasone (FLONASE) 50 MCG/ACT nasal spray 1 spray by Each Nostril route 2 times daily 16 g 0    sodium bicarbonate 650 MG tablet Take 1 tablet by mouth 3 times daily 270 tablet 1    Cholecalciferol (VITAMIN D) 2000 units CAPS capsule Take 2,000 Units by mouth daily      aspirin 81 MG tablet Take 81 mg by mouth daily. Omega-3 Fatty Acids (FISH OIL) 1000 MG CAPS Take 3,000 mg by mouth daily       lisinopril (PRINIVIL;ZESTRIL) 10 MG tablet take 1 tablet by mouth once daily (Patient not taking: Reported on 9/7/2022)       No current facility-administered medications for this visit.        Lab Results:    CBC:   Lab Results   Component Value Date    WBC 7.8 09/02/2022    HGB 13.1 (L) 09/02/2022    HCT 42.5 09/02/2022    MCV 96.8 (H) 09/02/2022     09/02/2022     BMP:    Lab Results   Component Value Date     09/02/2022     03/03/2022     09/02/2021    K 4.8 09/02/2022    K 4.1 03/03/2022    K 5.2 09/02/2021     09/02/2022     03/03/2022     09/02/2021    CO2 16 (L) 09/02/2022    CO2 15 (L) 03/03/2022    CO2 16 (L) 09/02/2021    BUN 95 (H) 09/02/2022    BUN 92 (H) 03/03/2022    BUN 65 (H) 09/02/2021    CREATININE 7.3 (HH) 09/02/2022    CREATININE 7.3 (HH) 03/03/2022    CREATININE 4.7 (HH) 09/02/2021    GLUCOSE 101 09/02/2022    GLUCOSE 104 03/03/2022    GLUCOSE 103 09/02/2021      Hepatic:   Lab Results   Component Value Date    AST 13 05/07/2018    AST 18 11/16/2016    AST 20 11/07/2016    ALT 12 05/07/2018    ALT 14 11/16/2016    ALT 20 11/07/2016    BILITOT 0.5 05/07/2018    BILITOT 0.5 11/16/2016    BILITOT 0.3 11/07/2016    ALKPHOS 74 05/07/2018    ALKPHOS 84 11/16/2016    ALKPHOS 80 11/07/2016     BNP: No results found for: BNP  Lipids:   Lab Results   Component Value Date    CHOL 200 (H) 09/02/2022 HDL 28 09/02/2022     INR: No results found for: INR  URINE:   Lab Results   Component Value Date/Time    PROTUR 60.8 08/27/2019 06:50 AM     No results found for: Kwan Maudlin, PHUR, LABCAST, WBCUA, RBCUA, MUCUS, TRICHOMONAS, YEAST, BACTERIA, CLARITYU, SPECGRAV, LEUKOCYTESUR, UROBILINOGEN, BILIRUBINUR, BLOODU, GLUCOSEU, KETUA, AMORPHOUS   Microalbumen/Creatinine ratio:  No components found for: RUCREAT    Objective:   Vitals: BP (!) 155/98 (Site: Right Upper Arm, Position: Sitting, Cuff Size: Large Adult)   Pulse 86   Wt 197 lb (89.4 kg)   SpO2 96%   BMI 25.29 kg/m²      Constitutional:  Alert, awake, no apparent distress  Skin:normal with no rash or any significant lesions  HEENT:Pupils are reactive . Throat is clear. Oral mucosa is moist.  Neck:supple with no thyromegaly, JVD, lymphadenopathy or bruit   Cardiovascular: Regular sinus rhythm without murmur, rubs or gallops   Respiratory:  Clear to auscultation with no wheezes or rales  Abdomen: Good bowel sound, soft, non tender and no bruit  Ext: No LE edema  Musculoskeletal:Intact  Neuro:Alert, awake and oriented with no obvious focal deficit. Speech is normal.    Electronically signed by Sierra Drew MD on 9/7/2022 at 10:22 AM   **This report has been created using voice recognition software. It maycontain minor  errors which are inherent in voice recognition technology. **

## 2023-01-10 ENCOUNTER — TELEPHONE (OUTPATIENT)
Dept: NEPHROLOGY | Age: 78
End: 2023-01-10

## 2023-01-10 NOTE — TELEPHONE ENCOUNTER
Telephone call from Reyes Cruz from Dr. Suresh  office. The doctor would like to put the patient on Medrol dose pack if that is OK with you.   Please advise

## 2023-02-15 RX ORDER — SODIUM BICARBONATE 650 MG/1
1300 TABLET ORAL 3 TIMES DAILY
Qty: 540 TABLET | Refills: 3 | Status: SHIPPED | OUTPATIENT
Start: 2023-02-15

## 2023-03-01 ENCOUNTER — HOSPITAL ENCOUNTER (OUTPATIENT)
Age: 78
Discharge: HOME | End: 2023-03-01
Payer: MEDICARE

## 2023-03-01 ENCOUNTER — TELEPHONE (OUTPATIENT)
Dept: NEPHROLOGY | Age: 78
End: 2023-03-01

## 2023-03-01 DIAGNOSIS — N18.5 CKD (CHRONIC KIDNEY DISEASE) STAGE 5, GFR LESS THAN 15 ML/MIN (HCC): ICD-10-CM

## 2023-03-01 DIAGNOSIS — D63.8 ANEMIA OF CHRONIC DISEASE: ICD-10-CM

## 2023-03-01 LAB
25(OH)D3 SERPL-MCNC: 43 NG/ML (ref 30–100)
ANION GAP SERPL CALC-SCNC: 22 MEQ/L (ref 8–16)
BUN SERPL-MCNC: 105 MG/DL (ref 7–22)
CALCIUM SERPL-MCNC: 8.8 MG/DL (ref 8.5–10.5)
CHLORIDE SERPL-SCNC: 108 MEQ/L (ref 98–111)
CO2 SERPL-SCNC: 16 MEQ/L (ref 23–33)
CREAT SERPL-MCNC: 8.6 MG/DL (ref 0.4–1.2)
FERRITIN SERPL IA-MCNC: 379 NG/ML (ref 22–322)
GFR SERPL CREATININE-BSD FRML MDRD: 6 ML/MIN/1.73M2
GLUCOSE SERPL-MCNC: 105 MG/DL (ref 70–108)
HCT VFR BLD AUTO: 36.2 % (ref 42–52)
HGB BLD-MCNC: 11.2 GM/DL (ref 14–18)
IRON SATN MFR SERPL: 35 % (ref 20–50)
IRON SERPL-MCNC: 76 UG/DL (ref 65–195)
POTASSIUM SERPL-SCNC: 4.5 MEQ/L (ref 3.5–5.2)
PTH-INTACT SERPL-MCNC: 512.7 PG/ML (ref 15–65)
SODIUM SERPL-SCNC: 146 MEQ/L (ref 135–145)
TIBC SERPL-MCNC: 220 UG/DL (ref 171–450)

## 2023-03-01 PROCEDURE — 83970 ASSAY OF PARATHORMONE: CPT

## 2023-03-01 PROCEDURE — 85014 HEMATOCRIT: CPT

## 2023-03-01 PROCEDURE — 82728 ASSAY OF FERRITIN: CPT

## 2023-03-01 PROCEDURE — 83540 ASSAY OF IRON: CPT

## 2023-03-01 PROCEDURE — 80048 BASIC METABOLIC PNL TOTAL CA: CPT

## 2023-03-01 PROCEDURE — 83550 IRON BINDING TEST: CPT

## 2023-03-01 PROCEDURE — 36415 COLL VENOUS BLD VENIPUNCTURE: CPT

## 2023-03-01 PROCEDURE — 85018 HEMOGLOBIN: CPT

## 2023-03-01 PROCEDURE — 82306 VITAMIN D 25 HYDROXY: CPT

## 2023-03-01 NOTE — TELEPHONE ENCOUNTER
Stephania from [facility] called with a critical Creatinine of 8.6. Dr. Scott Hawkins notified. He advised for us to check with patient - if patient having nausea, weakness, fatigue,decrease appetite he is to go to ER. If he does not have any of these symptoms he can wait to see Dr. Scott Hawkins at his appt on 3-8-23. If so he must keep this appt. Had to leave a message for patient to return my call.

## 2023-03-08 ENCOUNTER — OFFICE VISIT (OUTPATIENT)
Dept: NEPHROLOGY | Age: 78
End: 2023-03-08
Payer: MEDICARE

## 2023-03-08 VITALS
OXYGEN SATURATION: 98 % | HEIGHT: 75 IN | WEIGHT: 198 LBS | BODY MASS INDEX: 24.62 KG/M2 | HEART RATE: 97 BPM | DIASTOLIC BLOOD PRESSURE: 72 MMHG | SYSTOLIC BLOOD PRESSURE: 118 MMHG

## 2023-03-08 DIAGNOSIS — N25.81 HYPERPARATHYROIDISM, SECONDARY RENAL (HCC): ICD-10-CM

## 2023-03-08 DIAGNOSIS — I10 PRIMARY HYPERTENSION: ICD-10-CM

## 2023-03-08 DIAGNOSIS — E87.20 METABOLIC ACIDOSIS: ICD-10-CM

## 2023-03-08 DIAGNOSIS — E79.0 HYPERURICEMIA: ICD-10-CM

## 2023-03-08 DIAGNOSIS — N18.5 CKD (CHRONIC KIDNEY DISEASE) STAGE 5, GFR LESS THAN 15 ML/MIN (HCC): Primary | ICD-10-CM

## 2023-03-08 PROCEDURE — G8427 DOCREV CUR MEDS BY ELIG CLIN: HCPCS | Performed by: INTERNAL MEDICINE

## 2023-03-08 PROCEDURE — 1036F TOBACCO NON-USER: CPT | Performed by: INTERNAL MEDICINE

## 2023-03-08 PROCEDURE — 3078F DIAST BP <80 MM HG: CPT | Performed by: INTERNAL MEDICINE

## 2023-03-08 PROCEDURE — G8484 FLU IMMUNIZE NO ADMIN: HCPCS | Performed by: INTERNAL MEDICINE

## 2023-03-08 PROCEDURE — 1123F ACP DISCUSS/DSCN MKR DOCD: CPT | Performed by: INTERNAL MEDICINE

## 2023-03-08 PROCEDURE — G8420 CALC BMI NORM PARAMETERS: HCPCS | Performed by: INTERNAL MEDICINE

## 2023-03-08 PROCEDURE — 99214 OFFICE O/P EST MOD 30 MIN: CPT | Performed by: INTERNAL MEDICINE

## 2023-03-08 PROCEDURE — 3074F SYST BP LT 130 MM HG: CPT | Performed by: INTERNAL MEDICINE

## 2023-03-08 RX ORDER — DOXAZOSIN MESYLATE 1 MG/1
1 TABLET ORAL DAILY
Qty: 90 TABLET | Refills: 3 | Status: SHIPPED | OUTPATIENT
Start: 2023-03-08

## 2023-03-08 RX ORDER — CALCITRIOL 0.25 UG/1
0.5 CAPSULE, LIQUID FILLED ORAL DAILY
Qty: 90 CAPSULE | Refills: 3 | Status: SHIPPED | OUTPATIENT
Start: 2023-03-08

## 2023-03-08 NOTE — PROGRESS NOTES
Renal Progress Note    Assessment and Plan:      Diagnosis Orders   1. CKD (chronic kidney disease) stage 5, GFR less than 15 ml/min ([facility])        2. Primary hypertension        3. Metabolic acidosis        4. Hyperuricemia        5. Hyperparathyroidism, secondary renal ([address]                  PLAN:  I discussed my thoughts with the patient at length. He comprehended well. Lab result reviewed with him today in epic. Serum creatinine has increased to 8.6 mg/dL from 7.3 mg/dL. Serum bicarbonate level remains stable at 16 mEq/L  PTH has increased to 512.7 from 281.8. Vitamin D level is good at 43. Hemoglobin is good at 11.2 g.  It has however  decreased from 13.1 g 6 months ago. Medications reviewed   Hold lisinopril for now   Replace with doxazosin 1 mg po daily   Calcitriol 0.5 mcg once a day for high PTH level. I discussed all these with the patient. We went through the series of report in epic together. I believe  he needs to go on replacement therapy but he is still resistant. Return visit in 1 month though he wants to come back in 3 months  I insist on 1 month however. I encouraged him to take the sodium bicarbonate as prescribed. Patient Active Problem List   Diagnosis    Proteinuria    HTN (hypertension)    Gout    HLD (hyperlipidemia)    Prerenal azotemia    Vitamin D deficiency    CKD (chronic kidney disease) stage 4, GFR 15-29 ml/min ([facility])    Metabolic acidemia    Metabolic acidosis    Hypernatremia    CKD (chronic kidney disease), stage IV ([address]    Hyperparathyroidism, secondary renal ([address]           Subjective:   Chief complaint:  Chief Complaint   Patient presents with    Chronic Kidney Disease     v      HPI:This is a follow up visit for Mr. Kulwant Boykin who is here today for return appointment. I see him for chronic kidney disease among other things. He was last seen about 6 months ago. At that time we asked him to come back in 3 months but he declined.   He appears to be doing well since then with no issues of concern today. His kidney function is however progressively worsening. Serum creatinine was 7.3 mg/dL in September 2022. Today it is 8.6 mg/dL. Serum bicarbonate is  16 mEq/L. He is supposed to be on sodium bicarbonate 650 mg 2 tablets 3 times a day. ROS:  Pertinent positives stated above in HPI. All other systems were reviewed and were negative. Medications:     Current Outpatient Medications   Medication Sig Dispense Refill    sodium bicarbonate 650 MG tablet Take 2 tablets by mouth 3 times daily 540 tablet 3    lisinopril (PRINIVIL;ZESTRIL) 5 MG tablet 5 mg      cetirizine (ZYRTEC) 10 MG tablet take 1 tablet by mouth once daily if needed for allergies      Cholecalciferol (VITAMIN D) 2000 units CAPS capsule Take 2,000 Units by mouth daily      aspirin 81 MG tablet Take 81 mg by mouth daily. Omega-3 Fatty Acids (FISH OIL) 1000 MG CAPS Take 3,000 mg by mouth daily       fluticasone (FLONASE) 50 MCG/ACT nasal spray 1 spray by Each Nostril route 2 times daily (Patient not taking: Reported on 3/8/2023) 16 g 0     No current facility-administered medications for this visit.        Lab Results:    CBC:   Lab Results   Component Value Date    WBC 7.8 09/02/2022    HGB 11.2 (L) 03/01/2023    HCT 36.2 (L) 03/01/2023    MCV 96.8 (H) 09/02/2022     09/02/2022     BMP:    Lab Results   Component Value Date     (H) 03/01/2023     09/02/2022     03/03/2022    K 4.5 03/01/2023    K 4.8 09/02/2022    K 4.1 03/03/2022     03/01/2023     09/02/2022     03/03/2022    CO2 16 (L) 03/01/2023    CO2 16 (L) 09/02/2022    CO2 15 (L) 03/03/2022     (H) 03/01/2023    BUN 95 (H) 09/02/2022    BUN 92 (H) 03/03/2022    CREATININE 8.6 (HH) 03/01/2023    CREATININE 7.3 (HH) 09/02/2022    CREATININE 7.3 (HH) 03/03/2022    GLUCOSE 105 03/01/2023    GLUCOSE 101 09/02/2022    GLUCOSE 104 03/03/2022      Hepatic:   Lab Results   Component Value Date    AST 13 05/07/2018    AST 18 11/16/2016    AST 20 11/07/2016    ALT 12 05/07/2018    ALT 14 11/16/2016    ALT 20 11/07/2016    BILITOT 0.5 05/07/2018    BILITOT 0.5 11/16/2016    BILITOT 0.3 11/07/2016    ALKPHOS 74 05/07/2018    ALKPHOS 84 11/16/2016    ALKPHOS 80 11/07/2016     BNP: No results found for: BNP  Lipids:   Lab Results   Component Value Date    CHOL 200 (H) 09/02/2022    HDL 28 09/02/2022     INR: No results found for: INR  URINE:   Lab Results   Component Value Date/Time    PROTUR 60.8 08/27/2019 06:50 AM     No results found for: [place], PHUR, LABCAST, WBCUA, RBCUA, MUCUS, TRICHOMONAS, YEAST, BACTERIA, CLARITYU, SPECGRAV, LEUKOCYTESUR, UROBILINOGEN, BILIRUBINUR, BLOODU, GLUCOSEU, KETUA, AMORPHOUS   Microalbumen/Creatinine ratio:  No components found for: RUCREAT    Objective:   Vitals: /72 (Site: Right Upper Arm, Position: Sitting, Cuff Size: Small Adult)   Pulse 97   Ht 6' 3\" (1.905 m)   Wt 198 lb (89.8 kg)   SpO2 98%   BMI 24.75 kg/m²      Constitutional:  Alert, awake, no apparent distress  Skin:normal with no rash or any significant lesions  HEENT:Pupils are reactive . Throat is clear. Oral mucosa is moist.  Neck:supple with no thyromegaly, JVD, lymphadenopathy or bruit **  Cardiovascular: Regular sinus rhythm without murmur, rubs or gallops   Respiratory:  Clear to auscultation with no wheezes or rales  Abdomen: Good bowel sound, soft, non tender and no bruit  Ext: No LE edema  Musculoskeletal:Intact  Neuro:Alert, awake and oriented with no obvious focal deficit. Speech is normal.*    Electronically signed by Kameron Card MD on 3/8/2023 at 9:55 AM   **This report has been created using voice recognition software. It maycontain minor  errors which are inherent in voice recognition technology. **

## 2023-04-07 ENCOUNTER — HOSPITAL ENCOUNTER (OUTPATIENT)
Age: 78
Discharge: HOME | End: 2023-04-07
Payer: MEDICARE

## 2023-04-07 DIAGNOSIS — N25.81 HYPERPARATHYROIDISM, SECONDARY RENAL (HCC): ICD-10-CM

## 2023-04-07 DIAGNOSIS — E79.0 HYPERURICEMIA: ICD-10-CM

## 2023-04-07 DIAGNOSIS — N18.5 CKD (CHRONIC KIDNEY DISEASE) STAGE 5, GFR LESS THAN 15 ML/MIN (HCC): ICD-10-CM

## 2023-04-07 LAB
25(OH)D3 SERPL-MCNC: 44 NG/ML (ref 30–100)
ANION GAP SERPL CALC-SCNC: 18 MEQ/L (ref 8–16)
BUN SERPL-MCNC: 103 MG/DL (ref 7–22)
CALCIUM SERPL-MCNC: 8.4 MG/DL (ref 8.5–10.5)
CHLORIDE SERPL-SCNC: 111 MEQ/L (ref 98–111)
CO2 SERPL-SCNC: 18 MEQ/L (ref 23–33)
CREAT SERPL-MCNC: 8.6 MG/DL (ref 0.4–1.2)
CREAT UR-MCNC: 59.2 MG/DL
GFR SERPL CREATININE-BSD FRML MDRD: 6 ML/MIN/1.73M2
GLUCOSE SERPL-MCNC: 106 MG/DL (ref 70–108)
MAGNESIUM SERPL-MCNC: 2.3 MG/DL (ref 1.6–2.4)
MICROALBUMIN UR-MCNC: 37.41 MG/DL
MICROALBUMIN/CREAT RATIO PNL UR: 632 MG/G (ref 0–30)
POTASSIUM SERPL-SCNC: 4.7 MEQ/L (ref 3.5–5.2)
PTH-INTACT SERPL-MCNC: 423.1 PG/ML (ref 15–65)
SODIUM SERPL-SCNC: 147 MEQ/L (ref 135–145)
URATE SERPL-MCNC: 9.1 MG/DL (ref 3.7–7)

## 2023-04-07 PROCEDURE — 83735 ASSAY OF MAGNESIUM: CPT

## 2023-04-07 PROCEDURE — 80048 BASIC METABOLIC PNL TOTAL CA: CPT

## 2023-04-07 PROCEDURE — 83970 ASSAY OF PARATHORMONE: CPT

## 2023-04-07 PROCEDURE — 36415 COLL VENOUS BLD VENIPUNCTURE: CPT

## 2023-04-07 PROCEDURE — 82043 UR ALBUMIN QUANTITATIVE: CPT

## 2023-04-07 PROCEDURE — 82306 VITAMIN D 25 HYDROXY: CPT

## 2023-04-07 PROCEDURE — 84550 ASSAY OF BLOOD/URIC ACID: CPT

## 2023-06-08 ENCOUNTER — TELEPHONE (OUTPATIENT)
Dept: NEPHROLOGY | Age: 78
End: 2023-06-08

## 2023-06-08 ENCOUNTER — HOSPITAL ENCOUNTER (OUTPATIENT)
Age: 78
Discharge: HOME | End: 2023-06-08
Payer: MEDICARE

## 2023-06-08 DIAGNOSIS — N18.5 CKD (CHRONIC KIDNEY DISEASE) STAGE 5, GFR LESS THAN 15 ML/MIN (HCC): ICD-10-CM

## 2023-06-08 LAB
25(OH)D3 SERPL-MCNC: 44 NG/ML (ref 30–100)
ANION GAP SERPL CALC-SCNC: 25 MEQ/L (ref 8–16)
BUN SERPL-MCNC: 122 MG/DL (ref 7–22)
CALCIUM SERPL-MCNC: 8.6 MG/DL (ref 8.5–10.5)
CHLORIDE SERPL-SCNC: 110 MEQ/L (ref 98–111)
CO2 SERPL-SCNC: 12 MEQ/L (ref 23–33)
CREAT SERPL-MCNC: 10.4 MG/DL (ref 0.4–1.2)
CREAT UR-MCNC: 58.9 MG/DL
GFR SERPL CREATININE-BSD FRML MDRD: 5 ML/MIN/1.73M2
GLUCOSE SERPL-MCNC: 106 MG/DL (ref 70–108)
PHOSPHATE SERPL-MCNC: 8.6 MG/DL (ref 2.4–4.7)
POTASSIUM SERPL-SCNC: 5.2 MEQ/L (ref 3.5–5.2)
PROT UR-MCNC: 69.9 MG/DL
PROT/CREAT 24H UR: 1.19 MG/G{CREAT}
PTH-INTACT SERPL-MCNC: 547.3 PG/ML (ref 15–65)
SODIUM SERPL-SCNC: 147 MEQ/L (ref 135–145)

## 2023-06-08 PROCEDURE — 82570 ASSAY OF URINE CREATININE: CPT

## 2023-06-08 PROCEDURE — 80048 BASIC METABOLIC PNL TOTAL CA: CPT

## 2023-06-08 PROCEDURE — 83970 ASSAY OF PARATHORMONE: CPT

## 2023-06-08 PROCEDURE — 84156 ASSAY OF PROTEIN URINE: CPT

## 2023-06-08 PROCEDURE — 84100 ASSAY OF PHOSPHORUS: CPT

## 2023-06-08 PROCEDURE — 82306 VITAMIN D 25 HYDROXY: CPT

## 2023-06-08 PROCEDURE — 36415 COLL VENOUS BLD VENIPUNCTURE: CPT

## 2023-06-08 NOTE — TELEPHONE ENCOUNTER
----- Message from Trevor Bosch MD sent at 6/8/2023  4:14 PM EDT -----  Kidney function is much worse than before. I want him to go to the hospital to be evaluated.

## 2023-06-14 ENCOUNTER — APPOINTMENT (OUTPATIENT)
Dept: ULTRASOUND IMAGING | Age: 78
DRG: 674 | End: 2023-06-14
Payer: MEDICARE

## 2023-06-14 ENCOUNTER — HOSPITAL ENCOUNTER (INPATIENT)
Age: 78
LOS: 3 days | Discharge: HOME | DRG: 674 | End: 2023-06-17
Attending: FAMILY MEDICINE | Admitting: STUDENT IN AN ORGANIZED HEALTH CARE EDUCATION/TRAINING PROGRAM
Payer: MEDICARE

## 2023-06-14 DIAGNOSIS — N18.5 ACUTE RENAL FAILURE WITH OTHER SPECIFIED PATHOLOGICAL KIDNEY LESION SUPERIMPOSED ON STAGE 5 CHRONIC KIDNEY DISEASE, NOT ON CHRONIC DIALYSIS (HCC): Primary | ICD-10-CM

## 2023-06-14 DIAGNOSIS — N17.8 ACUTE RENAL FAILURE WITH OTHER SPECIFIED PATHOLOGICAL KIDNEY LESION SUPERIMPOSED ON STAGE 5 CHRONIC KIDNEY DISEASE, NOT ON CHRONIC DIALYSIS (HCC): Primary | ICD-10-CM

## 2023-06-14 PROBLEM — N17.9 ACUTE KIDNEY INJURY SUPERIMPOSED ON CKD (HCC): Status: ACTIVE | Noted: 2023-06-14

## 2023-06-14 PROBLEM — N18.9 ACUTE KIDNEY INJURY SUPERIMPOSED ON CKD (HCC): Status: ACTIVE | Noted: 2023-06-14

## 2023-06-14 PROBLEM — N18.6 ESRD (END STAGE RENAL DISEASE) (HCC): Status: ACTIVE | Noted: 2023-06-14

## 2023-06-14 LAB
25(OH)D3 SERPL-MCNC: 42 NG/ML (ref 30–100)
ALBUMIN SERPL BCP-MCNC: 3 GM/DL (ref 3.4–5)
ALP SERPL-CCNC: 79 U/L (ref 46–116)
ALT SERPL W P-5'-P-CCNC: 14 U/L (ref 14–63)
ANION GAP SERPL CALC-SCNC: 19 MEQ/L (ref 8–16)
AST SERPL W P-5'-P-CCNC: 7 U/L (ref 15–37)
BASOPHILS # BLD: 0.4 % (ref 0–3)
BASOPHILS ABSOLUTE: 0 THOU/MM3 (ref 0–0.1)
BILIRUB SERPL-MCNC: 0.3 MG/DL (ref 0.2–1)
BUN SERPL-MCNC: 138 MG/DL (ref 7–18)
CA-I BLD ISE-SCNC: 1.03 MMOL/L (ref 1.12–1.32)
CALCIUM SERPL-MCNC: 8.2 MG/DL (ref 8.5–10.1)
CHLORIDE SERPL-SCNC: 112 MEQ/L (ref 98–107)
CO2 SERPL-SCNC: 16 MEQ/L (ref 21–32)
CREAT SERPL-MCNC: 10.9 MG/DL (ref 0.6–1.3)
EOSINOPHILS ABSOLUTE: 0.2 THOU/MM3 (ref 0–0.5)
EOSINOPHILS RELATIVE PERCENT: 2.8 % (ref 0–4)
GFR SERPL CREATININE-BSD FRML MDRD: 4 ML/MIN/1.73M2
GLUCOSE SERPL-MCNC: 120 MG/DL (ref 74–106)
HCT VFR BLD CALC: 32.2 % (ref 42–52)
HEMOGLOBIN: 10.2 GM/DL (ref 14–18)
IMMATURE GRANS (ABS): 0.01 THOU/MM3 (ref 0–0.07)
IMMATURE GRANULOCYTES: 0 %
LYMPHOCYTES # BLD AUTO: 9.8 % (ref 15–47)
LYMPHOCYTES ABSOLUTE: 0.8 THOU/MM3 (ref 1–4.8)
MAGNESIUM SERPL-MCNC: 2.1 MG/DL (ref 1.8–2.4)
MCH RBC QN AUTO: 29.7 PG (ref 26–32)
MCHC RBC AUTO-ENTMCNC: 31.7 GM/DL (ref 31–35)
MCV RBC AUTO: 93.9 FL (ref 80–94)
MONOCYTES: 0.5 THOU/MM3 (ref 0.3–1.3)
MONOCYTES: 6.2 % (ref 0–12)
PDW BLD-RTO: 13.6 % (ref 11.5–14.9)
PLATELET # BLD AUTO: 139 THOU/MM3 (ref 130–400)
PMV BLD AUTO: 11.1 FL (ref 9.4–12.4)
POTASSIUM SERPL-SCNC: 5.5 MEQ/L (ref 3.5–5.1)
POTASSIUM SERPL-SCNC: 5.5 MEQ/L (ref 3.5–5.2)
PROT SERPL-MCNC: 6.7 GM/DL (ref 6.4–8.2)
RBC # BLD: 3.43 MILL/MM3 (ref 4.5–6.1)
SEG NEUTROPHILS: 80.7 % (ref 43–75)
SEGMENTED NEUTROPHILS ABSOLUTE COUNT: 6.4 THOU/MM3 (ref 1.8–7.7)
SODIUM SERPL-SCNC: 147 MEQ/L (ref 136–145)
WBC # BLD: 7.9 THOU/MM3 (ref 4.8–10.8)

## 2023-06-14 PROCEDURE — 83540 ASSAY OF IRON: CPT

## 2023-06-14 PROCEDURE — 2500000003 HC RX 250 WO HCPCS: Performed by: INTERNAL MEDICINE

## 2023-06-14 PROCEDURE — 36415 COLL VENOUS BLD VENIPUNCTURE: CPT

## 2023-06-14 PROCEDURE — 80053 COMPREHEN METABOLIC PANEL: CPT

## 2023-06-14 PROCEDURE — 76770 US EXAM ABDO BACK WALL COMP: CPT

## 2023-06-14 PROCEDURE — 6360000002 HC RX W HCPCS

## 2023-06-14 PROCEDURE — 1200000003 HC TELEMETRY R&B

## 2023-06-14 PROCEDURE — 99222 1ST HOSP IP/OBS MODERATE 55: CPT | Performed by: INTERNAL MEDICINE

## 2023-06-14 PROCEDURE — 99285 EMERGENCY DEPT VISIT HI MDM: CPT

## 2023-06-14 PROCEDURE — 6370000000 HC RX 637 (ALT 250 FOR IP)

## 2023-06-14 PROCEDURE — 85025 COMPLETE CBC W/AUTO DIFF WBC: CPT

## 2023-06-14 PROCEDURE — 99223 1ST HOSP IP/OBS HIGH 75: CPT | Performed by: STUDENT IN AN ORGANIZED HEALTH CARE EDUCATION/TRAINING PROGRAM

## 2023-06-14 PROCEDURE — 82306 VITAMIN D 25 HYDROXY: CPT

## 2023-06-14 PROCEDURE — 82607 VITAMIN B-12: CPT

## 2023-06-14 PROCEDURE — 6370000000 HC RX 637 (ALT 250 FOR IP): Performed by: INTERNAL MEDICINE

## 2023-06-14 PROCEDURE — 83550 IRON BINDING TEST: CPT

## 2023-06-14 PROCEDURE — 2580000003 HC RX 258: Performed by: FAMILY MEDICINE

## 2023-06-14 PROCEDURE — 93005 ELECTROCARDIOGRAM TRACING: CPT

## 2023-06-14 PROCEDURE — 93010 ELECTROCARDIOGRAM REPORT: CPT | Performed by: NUCLEAR MEDICINE

## 2023-06-14 PROCEDURE — 2580000003 HC RX 258: Performed by: INTERNAL MEDICINE

## 2023-06-14 PROCEDURE — 82728 ASSAY OF FERRITIN: CPT

## 2023-06-14 PROCEDURE — 82746 ASSAY OF FOLIC ACID SERUM: CPT

## 2023-06-14 PROCEDURE — 84132 ASSAY OF SERUM POTASSIUM: CPT

## 2023-06-14 PROCEDURE — 83735 ASSAY OF MAGNESIUM: CPT

## 2023-06-14 PROCEDURE — 2580000003 HC RX 258

## 2023-06-14 PROCEDURE — 82330 ASSAY OF CALCIUM: CPT

## 2023-06-14 RX ORDER — SODIUM CHLORIDE 9 MG/ML
INJECTION, SOLUTION INTRAVENOUS PRN
Status: DISCONTINUED | OUTPATIENT
Start: 2023-06-14 | End: 2023-06-17 | Stop reason: HOSPADM

## 2023-06-14 RX ORDER — SODIUM CHLORIDE 9 MG/ML
INJECTION, SOLUTION INTRAVENOUS CONTINUOUS
Status: DISCONTINUED | OUTPATIENT
Start: 2023-06-14 | End: 2023-06-14

## 2023-06-14 RX ORDER — SODIUM CHLORIDE 0.9 % (FLUSH) 0.9 %
5-40 SYRINGE (ML) INJECTION EVERY 12 HOURS SCHEDULED
Status: DISCONTINUED | OUTPATIENT
Start: 2023-06-14 | End: 2023-06-17 | Stop reason: HOSPADM

## 2023-06-14 RX ORDER — VITAMIN B COMPLEX
2000 TABLET ORAL DAILY
Status: DISCONTINUED | OUTPATIENT
Start: 2023-06-15 | End: 2023-06-17 | Stop reason: HOSPADM

## 2023-06-14 RX ORDER — SODIUM CHLORIDE 0.9 % (FLUSH) 0.9 %
5-40 SYRINGE (ML) INJECTION PRN
Status: DISCONTINUED | OUTPATIENT
Start: 2023-06-14 | End: 2023-06-17 | Stop reason: HOSPADM

## 2023-06-14 RX ORDER — HEPARIN SODIUM 5000 [USP'U]/ML
5000 INJECTION, SOLUTION INTRAVENOUS; SUBCUTANEOUS EVERY 8 HOURS SCHEDULED
Status: DISCONTINUED | OUTPATIENT
Start: 2023-06-14 | End: 2023-06-17 | Stop reason: HOSPADM

## 2023-06-14 RX ORDER — FAMOTIDINE 20 MG/1
20 TABLET, FILM COATED ORAL 2 TIMES DAILY
Status: DISCONTINUED | OUTPATIENT
Start: 2023-06-14 | End: 2023-06-17 | Stop reason: HOSPADM

## 2023-06-14 RX ORDER — ACETAMINOPHEN 650 MG/1
650 SUPPOSITORY RECTAL EVERY 6 HOURS PRN
Status: DISCONTINUED | OUTPATIENT
Start: 2023-06-14 | End: 2023-06-17 | Stop reason: HOSPADM

## 2023-06-14 RX ORDER — CALCITRIOL 0.25 UG/1
0.5 CAPSULE, LIQUID FILLED ORAL DAILY
Status: DISCONTINUED | OUTPATIENT
Start: 2023-06-15 | End: 2023-06-17 | Stop reason: HOSPADM

## 2023-06-14 RX ORDER — ACETAMINOPHEN 325 MG/1
650 TABLET ORAL EVERY 6 HOURS PRN
Status: DISCONTINUED | OUTPATIENT
Start: 2023-06-14 | End: 2023-06-17 | Stop reason: HOSPADM

## 2023-06-14 RX ORDER — DOXAZOSIN MESYLATE 1 MG/1
1 TABLET ORAL DAILY
Status: DISCONTINUED | OUTPATIENT
Start: 2023-06-15 | End: 2023-06-16

## 2023-06-14 RX ADMIN — SODIUM CHLORIDE: 9 INJECTION, SOLUTION INTRAVENOUS at 13:07

## 2023-06-14 RX ADMIN — SODIUM BICARBONATE: 84 INJECTION, SOLUTION INTRAVENOUS at 21:19

## 2023-06-14 RX ADMIN — METOPROLOL TARTRATE 12.5 MG: 25 TABLET, FILM COATED ORAL at 21:13

## 2023-06-14 RX ADMIN — SODIUM ZIRCONIUM CYCLOSILICATE 10 G: 10 POWDER, FOR SUSPENSION ORAL at 21:13

## 2023-06-14 RX ADMIN — HEPARIN SODIUM 5000 UNITS: 5000 INJECTION INTRAVENOUS; SUBCUTANEOUS at 21:13

## 2023-06-14 RX ADMIN — SODIUM CHLORIDE, PRESERVATIVE FREE 10 ML: 5 INJECTION INTRAVENOUS at 21:13

## 2023-06-14 ASSESSMENT — ENCOUNTER SYMPTOMS
COUGH: 0
DIARRHEA: 1
SHORTNESS OF BREATH: 1
ABDOMINAL PAIN: 0
VOMITING: 0
NAUSEA: 0

## 2023-06-14 NOTE — ED NOTES
[facility] arrived, report given to intermediate EMT. Patient denies any questions or concerns. Wife at bedside updated and denies concerns.        Merly Rabago RN  06/14/23 1036

## 2023-06-14 NOTE — ED NOTES
Patient presents today complaining of fatigue and diarrhea for the past few weeks. He was seen by Dr. Karissa Gant to follow up on some outpatient lab work, his creatinine from 6/8/23 was 10.4, patient is not on dialysis. Recommended by Donavon Espinoza he come in for fluids and further evaluation.      Odin Torres RN  06/14/23 2498

## 2023-06-14 NOTE — ED PROVIDER NOTES
ALLERGIES     has No Known Allergies. FAMILY HISTORY     He indicated that his mother is . He indicated that his father is . He indicated that the status of his brother is unknown.   family history includes Cancer in his father; High Blood Pressure in his brother; Kidney Disease in his brother. SOCIAL HISTORY      reports that he quit smoking about 42 years ago. His smoking use included cigarettes. He smoked an average of 1 pack per day. He has been exposed to tobacco smoke. He has never used smokeless tobacco. He reports current alcohol use of about 14.0 standard drinks per week. He reports that he does not use drugs. PHYSICAL EXAM     INITIAL VITALS:  weight is 197 lb (89.4 kg). His oral temperature is 97.7 °F (36.5 °C). His blood pressure is 147/75 (abnormal) and his pulse is 92. His respiration is 16 and oxygen saturation is 99%. Physical Exam  Vitals and nursing note reviewed. Constitutional:       General: He is not in acute distress. Appearance: He is not ill-appearing. Cardiovascular:      Rate and Rhythm: Normal rate and regular rhythm. Pulmonary:      Effort: Pulmonary effort is normal.      Breath sounds: Normal breath sounds. Abdominal:      General: Abdomen is flat. Tenderness: There is no abdominal tenderness. Skin:     General: Skin is dry. Capillary Refill: Capillary refill takes less than 2 seconds. Coloration: Skin is not pale. Findings: No bruising. Neurological:      General: No focal deficit present. Mental Status: He is alert and oriented to person, place, and time.         DIFFERENTIAL DIAGNOSIS:       DIAGNOSTIC RESULTS     EKG: All EKG's are interpreted by the Emergency Department Physician who either signs or Co-signs this chart in the absence of a cardiologist.      RADIOLOGY: non-plain film images(s) such as CT, Ultrasound and MRI are read by the radiologist.      LABS:   Labs Reviewed   CBC WITH AUTO DIFFERENTIAL -

## 2023-06-15 ENCOUNTER — APPOINTMENT (OUTPATIENT)
Dept: INTERVENTIONAL RADIOLOGY/VASCULAR | Age: 78
DRG: 674 | End: 2023-06-15
Payer: MEDICARE

## 2023-06-15 PROBLEM — N19 RENAL FAILURE SYNDROME: Status: ACTIVE | Noted: 2023-06-14

## 2023-06-15 LAB
ANION GAP SERPL CALC-SCNC: 18 MEQ/L (ref 8–16)
BUN SERPL-MCNC: 127 MG/DL (ref 7–22)
CALCIUM SERPL-MCNC: 8 MG/DL (ref 8.5–10.5)
CHLORIDE SERPL-SCNC: 111 MEQ/L (ref 98–111)
CO2 SERPL-SCNC: 16 MEQ/L (ref 23–33)
CREAT SERPL-MCNC: 10.8 MG/DL (ref 0.4–1.2)
DEPRECATED RDW RBC AUTO: 46.2 FL (ref 35–45)
EKG ATRIAL RATE: 94 BPM
EKG P AXIS: 29 DEGREES
EKG P-R INTERVAL: 176 MS
EKG Q-T INTERVAL: 360 MS
EKG QRS DURATION: 76 MS
EKG QTC CALCULATION (BAZETT): 450 MS
EKG R AXIS: 0 DEGREES
EKG T AXIS: 66 DEGREES
EKG VENTRICULAR RATE: 94 BPM
ERYTHROCYTE [DISTWIDTH] IN BLOOD BY AUTOMATED COUNT: 13.7 % (ref 11.5–14.5)
FERRITIN SERPL IA-MCNC: 378 NG/ML (ref 22–322)
FOLATE SERPL-MCNC: 6.4 NG/ML (ref 4.8–24.2)
GFR SERPL CREATININE-BSD FRML MDRD: 4 ML/MIN/1.73M2
GLUCOSE SERPL-MCNC: 104 MG/DL (ref 70–108)
HBV CORE IGM SERPL QL IA: NEGATIVE
HBV SURFACE AB SER QL IA: NEGATIVE
HBV SURFACE AG SERPL QL IA: NEGATIVE
HCT VFR BLD AUTO: 29.5 % (ref 42–52)
HGB BLD-MCNC: 9.2 GM/DL (ref 14–18)
IRON SATN MFR SERPL: 35 % (ref 20–50)
IRON SERPL-MCNC: 70 UG/DL (ref 65–195)
LV EF: 58 %
LVEF MODALITY: NORMAL
MAGNESIUM SERPL-MCNC: 2 MG/DL (ref 1.6–2.4)
MCH RBC QN AUTO: 29.8 PG (ref 26–33)
MCHC RBC AUTO-ENTMCNC: 31.2 GM/DL (ref 32.2–35.5)
MCV RBC AUTO: 95.5 FL (ref 80–94)
PHOSPHATE SERPL-MCNC: 8.1 MG/DL (ref 2.4–4.7)
PLATELET # BLD AUTO: 124 THOU/MM3 (ref 130–400)
PMV BLD AUTO: 11.1 FL (ref 9.4–12.4)
POTASSIUM SERPL-SCNC: 4.9 MEQ/L (ref 3.5–5.2)
RBC # BLD AUTO: 3.09 MILL/MM3 (ref 4.7–6.1)
SODIUM SERPL-SCNC: 145 MEQ/L (ref 135–145)
TIBC SERPL-MCNC: 201 UG/DL (ref 171–450)
VIT B12 SERPL-MCNC: 483 PG/ML (ref 211–911)
WBC # BLD AUTO: 7.2 THOU/MM3 (ref 4.8–10.8)

## 2023-06-15 PROCEDURE — 86706 HEP B SURFACE ANTIBODY: CPT

## 2023-06-15 PROCEDURE — 5A1D70Z PERFORMANCE OF URINARY FILTRATION, INTERMITTENT, LESS THAN 6 HOURS PER DAY: ICD-10-PCS | Performed by: INTERNAL MEDICINE

## 2023-06-15 PROCEDURE — 6360000002 HC RX W HCPCS: Performed by: INTERNAL MEDICINE

## 2023-06-15 PROCEDURE — C1769 GUIDE WIRE: HCPCS

## 2023-06-15 PROCEDURE — 86705 HEP B CORE ANTIBODY IGM: CPT

## 2023-06-15 PROCEDURE — 87340 HEPATITIS B SURFACE AG IA: CPT

## 2023-06-15 PROCEDURE — 99232 SBSQ HOSP IP/OBS MODERATE 35: CPT | Performed by: INTERNAL MEDICINE

## 2023-06-15 PROCEDURE — 83735 ASSAY OF MAGNESIUM: CPT

## 2023-06-15 PROCEDURE — 93306 TTE W/DOPPLER COMPLETE: CPT

## 2023-06-15 PROCEDURE — 90935 HEMODIALYSIS ONE EVALUATION: CPT

## 2023-06-15 PROCEDURE — 76937 US GUIDE VASCULAR ACCESS: CPT

## 2023-06-15 PROCEDURE — 02H633Z INSERTION OF INFUSION DEVICE INTO RIGHT ATRIUM, PERCUTANEOUS APPROACH: ICD-10-PCS | Performed by: RADIOLOGY

## 2023-06-15 PROCEDURE — 77001 FLUOROGUIDE FOR VEIN DEVICE: CPT

## 2023-06-15 PROCEDURE — 84100 ASSAY OF PHOSPHORUS: CPT

## 2023-06-15 PROCEDURE — 6360000002 HC RX W HCPCS: Performed by: RADIOLOGY

## 2023-06-15 PROCEDURE — 2580000003 HC RX 258

## 2023-06-15 PROCEDURE — 85027 COMPLETE CBC AUTOMATED: CPT

## 2023-06-15 PROCEDURE — 6360000002 HC RX W HCPCS

## 2023-06-15 PROCEDURE — 80048 BASIC METABOLIC PNL TOTAL CA: CPT

## 2023-06-15 PROCEDURE — 6370000000 HC RX 637 (ALT 250 FOR IP)

## 2023-06-15 PROCEDURE — 0JH63XZ INSERTION OF TUNNELED VASCULAR ACCESS DEVICE INTO CHEST SUBCUTANEOUS TISSUE AND FASCIA, PERCUTANEOUS APPROACH: ICD-10-PCS | Performed by: RADIOLOGY

## 2023-06-15 PROCEDURE — 36415 COLL VENOUS BLD VENIPUNCTURE: CPT

## 2023-06-15 PROCEDURE — 1200000003 HC TELEMETRY R&B

## 2023-06-15 PROCEDURE — 36558 INSERT TUNNELED CV CATH: CPT

## 2023-06-15 PROCEDURE — 99233 SBSQ HOSP IP/OBS HIGH 50: CPT | Performed by: STUDENT IN AN ORGANIZED HEALTH CARE EDUCATION/TRAINING PROGRAM

## 2023-06-15 RX ORDER — LOPERAMIDE HYDROCHLORIDE 2 MG/1
2 CAPSULE ORAL 2 TIMES DAILY PRN
Status: DISCONTINUED | OUTPATIENT
Start: 2023-06-15 | End: 2023-06-17 | Stop reason: HOSPADM

## 2023-06-15 RX ORDER — ONDANSETRON 2 MG/ML
4 INJECTION INTRAMUSCULAR; INTRAVENOUS ONCE
Status: COMPLETED | OUTPATIENT
Start: 2023-06-15 | End: 2023-06-15

## 2023-06-15 RX ORDER — MIDAZOLAM HYDROCHLORIDE 1 MG/ML
INJECTION INTRAMUSCULAR; INTRAVENOUS PRN
Status: COMPLETED | OUTPATIENT
Start: 2023-06-15 | End: 2023-06-15

## 2023-06-15 RX ADMIN — METOPROLOL TARTRATE 12.5 MG: 25 TABLET, FILM COATED ORAL at 08:36

## 2023-06-15 RX ADMIN — FAMOTIDINE 20 MG: 20 TABLET, FILM COATED ORAL at 08:35

## 2023-06-15 RX ADMIN — HEPARIN SODIUM 5000 UNITS: 5000 INJECTION INTRAVENOUS; SUBCUTANEOUS at 20:49

## 2023-06-15 RX ADMIN — ONDANSETRON 4 MG: 2 INJECTION INTRAMUSCULAR; INTRAVENOUS at 15:31

## 2023-06-15 RX ADMIN — HYDROMORPHONE HYDROCHLORIDE 1 MG: 1 INJECTION, SOLUTION INTRAMUSCULAR; INTRAVENOUS; SUBCUTANEOUS at 13:20

## 2023-06-15 RX ADMIN — METOPROLOL TARTRATE 12.5 MG: 25 TABLET, FILM COATED ORAL at 20:49

## 2023-06-15 RX ADMIN — MIDAZOLAM 1 MG: 1 INJECTION INTRAMUSCULAR; INTRAVENOUS at 13:20

## 2023-06-15 RX ADMIN — CALCITRIOL CAPSULES 0.25 MCG 0.5 MCG: 0.25 CAPSULE ORAL at 08:35

## 2023-06-15 RX ADMIN — DOXAZOSIN 1 MG: 1 TABLET ORAL at 08:36

## 2023-06-15 RX ADMIN — Medication 2000 UNITS: at 08:35

## 2023-06-15 RX ADMIN — HEPARIN SODIUM 5000 UNITS: 5000 INJECTION INTRAVENOUS; SUBCUTANEOUS at 06:29

## 2023-06-15 RX ADMIN — SODIUM CHLORIDE, PRESERVATIVE FREE 10 ML: 5 INJECTION INTRAVENOUS at 20:49

## 2023-06-15 NOTE — OP NOTE
Department of Radiology  Post Procedure Progress Note      Pre-Procedure Diagnosis:  Renal Failure    Procedure Performed: Dialysis Catheter insertion     Anesthesia: local , versed and dilaudid    Findings: successful    Immediate Complications:  None    Estimated Blood Loss: minimal    SEE DICTATED PROCEDURE NOTE FOR COMPLETE DETAILS.       Electronically signed by Pamela Arthur MD on 6/15/2023 at 1:36 PM

## 2023-06-15 NOTE — CONSULTS
[facility]    [address], [address]  [facility]  6/14/2023 8:18 PM    Pt Name:    Arron Zhang  MRN:     [id number]   [id number]  YOB: 1945  Admit Date:    6/14/2023 12:39 PM  Primary Care Physician:  PAULETTE Solomon - CNP        Reason for Consult:  GWEN/CKD V, hyperkalemia, metabolic acidosis    History:   The patient is a 77 y.o. male sent in by Dr. Itzel Pennington for worsening renal function. Patient has underlying CKD V since 2021, has not been on dialysis. Per Dr. Angel Maurer notes dialysis has been discussed with patient and he has refused. He has also declined dialysis education in the past as well as kidney ultrasound. He has additional history of HTN. He saw Dr. Itzel Pennington in the office today and labs noted to be worse with creatinine of 10. Patient was agreeable to come to the hospital.  He has been having diarrhea for the past few weeks. He has also been feeling extremely fatigued lately and  has been unable to do a lot of things that he likes doing since he has no energy. He denies nausea or vomiting. States his appetite has been good. Has lost about 3 pounds intentionally per patient. Denies edema. States he is urinating ok.     Past Medical History:  Past Medical History:   Diagnosis Date    Chronic kidney disease     Stage III B    Erythrocytosis     Gout     Hyperlipidemia     Hypertension     Prerenal azotemia     Proteinuria     Vitamin D deficiency        Past Surgical History:  Past Surgical History:   Procedure Laterality Date    ABDOMEN SURGERY      BACK SURGERY      COLON SURGERY      1ft removed    EYE SURGERY      OTHER SURGICAL HISTORY Left 5/20/2015    Left Hand/Wrist transfer tendon    SKIN BIOPSY         Family History:  Family History   Problem Relation Age of Onset    Cancer Father     High Blood Pressure Brother     Kidney Disease Brother        Social History:  Social History     Socioeconomic History    Marital

## 2023-06-15 NOTE — H&P
[facility]  Sedation/Analgesia History & Physical    Pt Name: Len Butlre  MRN: [id number]  YOB: 1945  Provider Performing Procedure: Pamela Arthur MD, MD  Primary Care Physician: PAULETTE Pereyra - CNP    Formulation and discussion of sedation / procedure plans, risks, benefits, side effects and alternatives with patient and/or responsible adult completed. PRE-PROCEDURE   DNR-CCA/DNR-CC []Yes [x]No  Brief History/Pre-Procedure Diagnosis: Renal failure          MEDICAL HISTORY  []CAD/Valve  []Liver Disease  []Lung Disease []Diabetes  []Hypertension []Renal Disease  [x]Additional information:       has a past medical history of Chronic kidney disease, Erythrocytosis, Gout, Hyperlipidemia, Hypertension, Prerenal azotemia, Proteinuria, and Vitamin D deficiency. SURGICAL HISTORY   has a past surgical history that includes back surgery; Abdomen surgery; skin biopsy; eye surgery; other surgical history (Left, 5/20/2015); and Colon surgery.   Additional information:       ALLERGIES   Allergies as of 06/14/2023    (No Known Allergies)     Additional information:       MEDICATIONS   Coumadin Use Last 5 Days [x]No []Yes  Antiplatelet drug therapy use last 5 days  [x]No []Yes  Other anticoagulant use last 5 days  []No [x]Yes    Current Facility-Administered Medications:     loperamide (IMODIUM) capsule 2 mg, 2 mg, Oral, BID PRN, Bettina Price MD    sodium chloride flush 0.9 % injection 5-40 mL, 5-40 mL, IntraVENous, 2 times per day, Anthony Lopez MD, 10 mL at 06/14/23 2113    sodium chloride flush 0.9 % injection 5-40 mL, 5-40 mL, IntraVENous, PRN, Anthony Lopez MD    0.9 % sodium chloride infusion, , IntraVENous, PRN, Anthony Lopez MD    heparin (porcine) injection 5,000 Units, 5,000 Units, SubCUTAneous, 3 times per day, Anthony Lopez MD, 5,000 Units at 06/15/23 4051    acetaminophen (TYLENOL) tablet 650 mg, 650 mg, Oral, Q6H PRN
I will have the patient admitted for further nephrologic review. \" - Frances Garcia MD [id number]    Past Medical History:        Diagnosis Date    Chronic kidney disease     Stage III B    Erythrocytosis     Gout     Hyperlipidemia     Hypertension     Prerenal azotemia     Proteinuria     Vitamin D deficiency        Past Surgical History:        Procedure Laterality Date    ABDOMEN SURGERY      BACK SURGERY      COLON SURGERY      1ft removed    EYE SURGERY      OTHER SURGICAL HISTORY Left 5/20/2015    Left Hand/Wrist transfer tendon    SKIN BIOPSY         Medications Prior to Admission:   Prior to Admission medications    Medication Sig Start Date End Date Taking? Authorizing Provider   calcitRIOL (ROCALTROL) 0.25 MCG capsule Take 2 capsules by mouth daily 3/8/23   Yung Mata MD   doxazosin (CARDURA) 1 MG tablet Take 1 tablet by mouth daily 3/8/23   Yung Mata MD   sodium bicarbonate 650 MG tablet Take 2 tablets by mouth 3 times daily 2/15/23   Yung Mata MD   Cholecalciferol (VITAMIN D) 2000 units CAPS capsule Take 2,000 Units by mouth daily    Historical Provider, MD   aspirin 81 MG tablet Take 1 tablet by mouth daily    Historical Provider, MD   Omega-3 Fatty Acids (FISH OIL) 1000 MG CAPS Take 3 capsules by mouth daily    Historical Provider, MD       Allergies:  Patient has no known allergies. Social History:    The patient currently lives at home. Tobacco use:   reports that he quit smoking about 42 years ago. His smoking use included cigarettes. He smoked an average of 1 pack per day. He has been exposed to tobacco smoke. He has never used smokeless tobacco.  Alcohol use:   reports current alcohol use of about 14.0 standard drinks per week. Drug use:  reports no history of drug use.      Family History:  as follows:      Problem Relation Age of Onset    Cancer Father     High Blood Pressure Brother     Kidney Disease Brother        Review of Systems:   Pertinent positives

## 2023-06-15 NOTE — FLOWSHEET NOTE
stable 2 hour treatment. 0 net uf. zofran administered for nausea. cath lines flushed with 0.9 ns, clamped and tegos in place. report called to primary nurse. 06/15/23 0291   Vital Signs   BP (!) 144/76   Temp 97.1 °F (36.2 °C)   Pulse 82   Respirations 18   Weight - Scale 208 lb 1.8 oz (94.4 kg)   Weight Method Bed scale   Percent Weight Change 0.21   Post-Hemodialysis Assessment   Post-Treatment Procedures Blood returned;Catheter Capped, clamped with Saline x2 ports   Machine Disinfection Process Acid/Vinegar Clean;Heat Disinfect; Exterior Machine Disinfection   Rinseback Volume (ml) 400 ml   Blood Volume Processed (Liters) 29.2 l/min   Dialyzer Clearance Lightly streaked   Duration of Treatment (minutes) 120 minutes   Heparin Amount Administered During Treatment (mL) 0 mL   Hemodialysis Intake (ml) 400 ml   Hemodialysis Output (ml) 218 ml   NET Removed (ml) -182

## 2023-06-16 LAB
ANION GAP SERPL CALC-SCNC: 17 MEQ/L (ref 8–16)
BUN SERPL-MCNC: 82 MG/DL (ref 7–22)
CALCIUM SERPL-MCNC: 8 MG/DL (ref 8.5–10.5)
CHLORIDE SERPL-SCNC: 109 MEQ/L (ref 98–111)
CO2 SERPL-SCNC: 19 MEQ/L (ref 23–33)
CREAT SERPL-MCNC: 8.1 MG/DL (ref 0.4–1.2)
GFR SERPL CREATININE-BSD FRML MDRD: 6 ML/MIN/1.73M2
GLUCOSE SERPL-MCNC: 90 MG/DL (ref 70–108)
MAGNESIUM SERPL-MCNC: 2.2 MG/DL (ref 1.6–2.4)
POTASSIUM SERPL-SCNC: 5.1 MEQ/L (ref 3.5–5.2)
SODIUM SERPL-SCNC: 145 MEQ/L (ref 135–145)

## 2023-06-16 PROCEDURE — 36415 COLL VENOUS BLD VENIPUNCTURE: CPT

## 2023-06-16 PROCEDURE — 99232 SBSQ HOSP IP/OBS MODERATE 35: CPT | Performed by: STUDENT IN AN ORGANIZED HEALTH CARE EDUCATION/TRAINING PROGRAM

## 2023-06-16 PROCEDURE — 6360000002 HC RX W HCPCS

## 2023-06-16 PROCEDURE — 1200000003 HC TELEMETRY R&B

## 2023-06-16 PROCEDURE — 99232 SBSQ HOSP IP/OBS MODERATE 35: CPT | Performed by: INTERNAL MEDICINE

## 2023-06-16 PROCEDURE — 90935 HEMODIALYSIS ONE EVALUATION: CPT

## 2023-06-16 PROCEDURE — 6370000000 HC RX 637 (ALT 250 FOR IP)

## 2023-06-16 PROCEDURE — 83735 ASSAY OF MAGNESIUM: CPT

## 2023-06-16 PROCEDURE — 80048 BASIC METABOLIC PNL TOTAL CA: CPT

## 2023-06-16 PROCEDURE — 2580000003 HC RX 258

## 2023-06-16 RX ADMIN — FAMOTIDINE 20 MG: 20 TABLET, FILM COATED ORAL at 08:18

## 2023-06-16 RX ADMIN — SODIUM CHLORIDE, PRESERVATIVE FREE 10 ML: 5 INJECTION INTRAVENOUS at 20:09

## 2023-06-16 RX ADMIN — METOPROLOL TARTRATE 12.5 MG: 25 TABLET, FILM COATED ORAL at 20:07

## 2023-06-16 RX ADMIN — CALCITRIOL CAPSULES 0.25 MCG 0.5 MCG: 0.25 CAPSULE ORAL at 08:18

## 2023-06-16 RX ADMIN — FAMOTIDINE 20 MG: 20 TABLET, FILM COATED ORAL at 20:06

## 2023-06-16 RX ADMIN — Medication 2000 UNITS: at 08:18

## 2023-06-16 RX ADMIN — HEPARIN SODIUM 5000 UNITS: 5000 INJECTION INTRAVENOUS; SUBCUTANEOUS at 22:58

## 2023-06-16 RX ADMIN — HEPARIN SODIUM 5000 UNITS: 5000 INJECTION INTRAVENOUS; SUBCUTANEOUS at 05:40

## 2023-06-16 RX ADMIN — SODIUM CHLORIDE, PRESERVATIVE FREE 10 ML: 5 INJECTION INTRAVENOUS at 08:18

## 2023-06-16 RX ADMIN — METOPROLOL TARTRATE 12.5 MG: 25 TABLET, FILM COATED ORAL at 08:18

## 2023-06-16 NOTE — FLOWSHEET NOTE
06/16/23 1505   Vital Signs   BP (!) 162/88   Temp 97.9 °F (36.6 °C)   Pulse 77   Respirations 17   Weight - Scale 205 lb 4 oz (93.1 kg)   Weight Method Bed scale   Percent Weight Change 0   Post-Hemodialysis Assessment   Post-Treatment Procedures Blood returned;Catheter Capped, clamped with Saline x2 ports   Machine Disinfection Process Acid/Vinegar Clean;Heat Disinfect; Exterior Machine Disinfection   Rinseback Volume (ml) 400 ml   Blood Volume Processed (Liters) 36.9 l/min   Dialyzer Clearance Lightly streaked   Duration of Treatment (minutes) 150 minutes   Heparin Amount Administered During Treatment (mL) 0 mL   Hemodialysis Intake (ml) 400 ml   Hemodialysis Output (ml) 400 ml   NET Removed (ml) 0     stable 2.5 hour treatment. 0 net uf,. cath lines flushed with 0.9 ns, clamped and tegos in place. Cath insertion site bleeding post treatment. Dressing changed and pressure dressing applied. Reported to Dr. Rohan Plascencia. report called to primary nurse.

## 2023-06-16 NOTE — CARE COORDINATION
6/16/23, 10:38 AM EDT    DISCHARGE ON GOING EVALUATION    Darrell Blackwood day: 2  Location: [facility]23/023-A Reason for admit: Acute kidney injury superimposed on CKD ([facility] [address] [N17.9, N18.9]  Acute renal failure with other specified pathological kidney lesion superimposed on stage 5 chronic kidney disease, not on chronic dialysis ([facility] [address] [N17.8, N18.5]  ESRD (end stage renal disease) ([facility] [address] [N18.6]   Procedure: 6-15-23 Dialysis Cath insertion  6-15-23 First HD run. Barriers to Discharge: HD cath placed yesterday and first HD run. Plan to dialyze again today and tomorrow with possible discharge tomorrow if OP HD is arranged. Received [employer] form confirming HD days and time. Copy given to pt and one placed in chart. CM received call from [facility] with [employer] who states pt can have afternoon chair time at [facility]. He will get back with this CM with confirmation that pt can start on Monday. PCP: PAULETTE Wang CNP  Readmission Risk Score: 16.9%  Patient Goals/Plan/Treatment Preferences: Plans return home with spouse. 6/16/23, 3:24 PM EDT    Patient goals/plan/ treatment preferences discussed by  and . Patient goals/plan/ treatment preferences reviewed with patient/ family. Patient/ family verbalize understanding of discharge plan and are in agreement with goal/plan/treatment preferences. Understanding was demonstrated using the teach back method. AVS provided by RN at time of discharge, which includes all necessary medical information pertaining to the patients current course of illness, treatment, post-discharge goals of care, and treatment preferences. Services At/After Discharge: Outpatient Hemodialysis at [facility]. IMM Letter  IMM Letter given to Patient/Family/Significant other/Guardian/POA/by[de-identified] Nguyen HARTMANN Case Manager  IMM Letter date given[de-identified] 06/16/23  IMM Letter time given[de-identified] 9646    Potential discharge in next 24-48 hrs.  Plans return home with
Return to Present Housing: Yes  Other Identified Issues/Barriers to RETURNING to current housing: No  Potential Assistance needed at discharge: N/A            Potential DME:    Patient expects to discharge to: [facility] for transportation at discharge: [address]: [address] / Plan: MEDICARE PART A AND B / Product Type: *No Product type* /     Does insurance require precert for SNF: No    Potential assistance Purchasing Medications: No  Meds-to-Beds request: Yes      [facility] [id number] - [address], [place] - [address] [facility] [phone] [place] [phone] [address] [facility] [address]  Phone: [phone] Fax: [phone]      Notes:    Factors facilitating achievement of predicted outcomes: Family support, Cooperative, Pleasant, and Good insight into deficits    Barriers to discharge: Medical complications    Additional Case Management Notes: Admitted from ER with fatigue, comes from Nephrology office. Recent diarrhea. Hx of renal disease. On admit creat 10.9 . [facility] for hyperkalemia and bicarb gtt. Renal US ordered. Nephrology planning placement of temp cath today and start HD.     1530 This CM completed on line request to [address] [place] [address]  [facility]. Procedure: No.    The Plan for Transition of Care is related to the following treatment goals of Acute kidney injury superimposed on CKD ([facility] [address] [N17.9, N18.9]  Acute renal failure with other specified pathological kidney lesion superimposed on stage 5 chronic kidney disease, not on chronic dialysis ([address] [N17.8, N18.5]  ESRD (end stage renal disease) ([address] [N18.6]    Patient Goals/Plan/Treatment Preferences: Met with Carin Serrano and his spouse. Pt from home and self sufficient and active. He is planning to perform PD and has received some materials to educate. Pt drives, is insured and has a PCP. Follow for needs. Transportation/Food Security/Housekeeping Addressed: No issues identified.      Jim Mtz RN  Case

## 2023-06-17 VITALS
HEIGHT: 75 IN | TEMPERATURE: 98.7 F | HEART RATE: 40 BPM | DIASTOLIC BLOOD PRESSURE: 68 MMHG | OXYGEN SATURATION: 97 % | SYSTOLIC BLOOD PRESSURE: 110 MMHG | BODY MASS INDEX: 25.52 KG/M2 | RESPIRATION RATE: 15 BRPM | WEIGHT: 205.25 LBS

## 2023-06-17 PROBLEM — E87.5 HYPERKALEMIA: Status: ACTIVE | Noted: 2023-06-17

## 2023-06-17 LAB
ANION GAP SERPL CALC-SCNC: 13 MEQ/L (ref 8–16)
BUN SERPL-MCNC: 54 MG/DL (ref 7–22)
CALCIUM SERPL-MCNC: 8.1 MG/DL (ref 8.5–10.5)
CHLORIDE SERPL-SCNC: 106 MEQ/L (ref 98–111)
CO2 SERPL-SCNC: 23 MEQ/L (ref 23–33)
CREAT SERPL-MCNC: 6.6 MG/DL (ref 0.4–1.2)
GFR SERPL CREATININE-BSD FRML MDRD: 8 ML/MIN/1.73M2
GLUCOSE SERPL-MCNC: 96 MG/DL (ref 70–108)
POTASSIUM SERPL-SCNC: 4.3 MEQ/L (ref 3.5–5.2)
SODIUM SERPL-SCNC: 142 MEQ/L (ref 135–145)

## 2023-06-17 PROCEDURE — 6360000002 HC RX W HCPCS

## 2023-06-17 PROCEDURE — 99232 SBSQ HOSP IP/OBS MODERATE 35: CPT | Performed by: INTERNAL MEDICINE

## 2023-06-17 PROCEDURE — 36415 COLL VENOUS BLD VENIPUNCTURE: CPT

## 2023-06-17 PROCEDURE — 6370000000 HC RX 637 (ALT 250 FOR IP)

## 2023-06-17 PROCEDURE — 2580000003 HC RX 258

## 2023-06-17 PROCEDURE — 99239 HOSP IP/OBS DSCHRG MGMT >30: CPT | Performed by: OPHTHALMOLOGY

## 2023-06-17 PROCEDURE — 80048 BASIC METABOLIC PNL TOTAL CA: CPT

## 2023-06-17 RX ADMIN — HEPARIN SODIUM 5000 UNITS: 5000 INJECTION INTRAVENOUS; SUBCUTANEOUS at 06:05

## 2023-06-17 RX ADMIN — FAMOTIDINE 20 MG: 20 TABLET, FILM COATED ORAL at 08:09

## 2023-06-17 RX ADMIN — CALCITRIOL CAPSULES 0.25 MCG 0.5 MCG: 0.25 CAPSULE ORAL at 08:08

## 2023-06-17 RX ADMIN — METOPROLOL TARTRATE 12.5 MG: 25 TABLET, FILM COATED ORAL at 08:07

## 2023-06-17 RX ADMIN — SODIUM CHLORIDE, PRESERVATIVE FREE 10 ML: 5 INJECTION INTRAVENOUS at 08:08

## 2023-06-17 RX ADMIN — Medication 2000 UNITS: at 08:08

## 2023-06-17 NOTE — DISCHARGE SUMMARY
Hospitalist Discharge Summary    Patient:  Pop Motta  YOB: 1945    MRN: [id number]   Acct: [de-identified]    Primary Care Physician: PAULETTE Fontaine CNP    Admit date:  6/14/2023    Discharge date:  6/17/2023    Discharge Diagnoses:  Principal Problem:    Renal failure syndrome  Active Problems:    ESRD (end stage renal disease) ([facility] [address]  Resolved Problems:    * No resolved hospital problems. *      Discharge Medications:         Medication List        START taking these medications      metoprolol tartrate 25 MG tablet  Commonly known as: LOPRESSOR  Take 0.5 tablets by mouth 2 times daily            CONTINUE taking these medications      calcitRIOL 0.25 MCG capsule  Commonly known as: Rocaltrol  Take 2 capsules by mouth daily     doxazosin 1 MG tablet  Commonly known as: Cardura  Take 1 tablet by mouth daily     fish oil 1000 MG capsule     sodium bicarbonate 650 MG tablet  Take 2 tablets by mouth 3 times daily     vitamin D 50 MCG (2000 UT) Caps capsule            STOP taking these medications      aspirin 81 MG tablet               Where to Get Your Medications        These medications were sent to [address] [place] [address]  [address] [place] [address] [address]      Phone: [phone]   metoprolol tartrate 25 MG tablet         Diet:  ADULT DIET; Regular; Low Sodium (2 gm); 2000 ml    Activity:  Activity as tolerated (Patient may move about with assist as indicated or with supervision.)    Follow-up:  in 1-2 weeks with PAULETTE Fontaine CNP.  To the specialist as arranged     Consultants:  nephrology    Procedures:  hemo-dialysis    Diagnostic Test:      Recent Labs     06/14/23  1316 06/15/23  0615   WBC 7.9 7.2   HGB 10.2* 9.2*   HCT 32.2* 29.5*    124*     Recent Labs     06/15/23  0615 06/16/23  0613 06/17/23  0710    145 142   K 4.9 5.1 4.3    109 106   CO2 16* 19* 23   * 82* 54*   CREATININE 10.8*

## 2023-06-17 NOTE — PROGRESS NOTES
1300 Patient received in IR for tunneled HD catheter insertion. 1302 This procedure has been fully reviewed with the patient and written informed consent has been obtained. 1317 Pt right chest/neck prepped with chloraprep. 1320 Procedure started with Dr. Paramjit Maddox. 1333 Procedure completed; patient tolerated well. Dressing to right chest; no bleeding noted. Report called to Leila Garces in inpatient dialysis; aware of slow drop in BP after sedation during procedure. 1338 Patient on bed; comfort ensured. Ice pack to right chest.   1340 Patient taken to inpatient dialysis via bed. Pt alert and oriented. Skin pink, warm, and dry. Respirations easy, regular and nonlabored on room air.
Completed Echo, Dr. Adelaida Murguia to read.
Hospitalist progress note          Patient: Joce Saravia  : [id number]  MRN: [id number]     Acct: [de-identified]    PCP: PAULETTE Velazquez CNP  Date of Admission: 2023    Hospital Problems             Last Modified POA    * (Principal) Renal failure syndrome 6/15/2023 Yes    ESRD (end stage renal disease) ([address] 2023 Yes   Assessment and Plan:  ESRD. Renal ultrasound with polycystic kidney disease. Started on hemodialysis has tunneled catheter placed was placed on 6/15 started on p.o dialysis session . Will be going for repeat session today. Case management working on the chair. Eventually will be transition to peritoneal dialysis as patient wants to continue this in the home setting. Aspirin need to be discontinued at the time of discharge as patient has to come back for peritoneal dialysis catheter placement. Off of bicarb infusion. Anion gap metabolic acidosis improving. Likely due to ESRD. Continue hemodialysis. Patient will go for dialysis today. Frequent PACs/PVCs. Keep the magnesium above 2 and potassium above 4. Denied any chest pain. Echocardiogram without any wall motion abnormalities, EF preserved. Will increase Lopressor to 25 mg twice daily. Hyperkalemia. Improving. No EKG changes. Patient received Lokelma 1 dose. Chronic diarrhea. Continue Imodium. Hx of secondary hypoparathyroidism d/t CKD. Ca 8.2, Ical 1.01 on admit. Continue to monitor Ical, replete pending recommendations by nephrology  Restrict diet to sodium 2g. HTN, primary. Per can't remember the name of home med. Not currently on OP meds. Pressures currently in 140s/70s, HR in 90s. Increase Lopressor to 25 mg twice daily. If persistently high will add amlodipine 5 mg.   Continue doxazosin    =======================================================================      Chief Complaint:  Hyperkalemia    History Of Present Illness:  Joce Saravia is a 77 y.o. male with PMHx of CKD5 and HTN who
Hospitalist progress note          Patient: Sara Weller  :   MRN: [id number]     Acct: [de-identified]    PCP: PAULETTE Lucero CNP  Date of Admission: 2023    Hospital Problems             Last Modified POA    * (Principal) Renal failure syndrome 6/15/2023 Yes    ESRD (end stage renal disease) ([facility] [address] 2023 Yes     Assessment and Plan:  Progressive kidney disease almost ESRD. Continue bicarb infusion. Nephrology on board likely will need renal replacement therapy during this admission. Significant anion gap metabolic acidosis likely due to ESRD. Continue bicarb infusion. Will need renal replacement therapy. Frequent PACs/PVCs. Keep the magnesium above 2 and potassium above 4. Denied any chest pain. We will get an echocardiogram to look for structure and function of the heart. We will start beta-blocker therapy. Hyperkalemia. Continue bicarb therapy. No EKG changes. Patient received Lokelma 1 dose last night. Chronic diarrhea. Worsened with Lokelma. We will get molecular GI panel. Imodium. Hx of secondary hypoparathyroidism d/t CKD. Ca 8.2, Ical 1.01 on admit. Continue to monitor Ical, replete pending recommendations by nephrology  Restrict diet to sodium 2g. HTN, primary. Per can't remember the name of home med. Not currently on OP meds. Pressures currently in 140s/70s, HR in 90s. Start lopressor 12.5mg BID  Continue doxazosin  ? NIDDM2. 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 Patient not on antihyperglycemics OP. Continue to monitor serum glucose with daily BMP, more often if indicated. Initiate insulin regimen if indicated.    =======================================================================      Chief Complaint:  Hyperkalemia    History Of Present Illness:  Sara Weller is a 77 y.o. male with PMHx of CKD5 and HTN who presents to [facility] from his nephrologist's office for hyperkalemia.  Patient
[facility]   Nephrology progress note  6/15/2023, 7:35 AM      Pt Name:    Brandon Mata  MRN:     [id number]     YOB: 1945  Admit Date:    6/14/2023 12:39 PM    Chief Complaint: Nephrology following for CKD V. Subjective:  Patient was seen and examined this morning. Had diarrhea overnight. Objective:  24HR INTAKE/OUTPUT:  No intake or output data in the 24 hours ending 06/15/23 0735      No intake/output data recorded. No intake/output data recorded.    Admission weight: 197 lb (89.4 kg)  Wt Readings from Last 3 Encounters:   06/14/23 197 lb (89.4 kg)   06/14/23 197 lb (89.4 kg)   04/12/23 201 lb (91.2 kg)        Vitals :   Vitals:    06/14/23 2045 06/14/23 2100 06/14/23 2356 06/15/23 0323   BP: (!) 182/58 (!) 167/52 (!) 137/59 128/64   Pulse: 100  76 87   Resp: 17  18 18   Temp: 97.4 °F (36.3 °C)  98.1 °F (36.7 °C) 98.2 °F (36.8 °C)   TempSrc: Oral  Oral Oral   SpO2: 99%  97% 98%   Weight:       Height:           Physical examination  General Appearance: alert and cooperative with exam, appears comfortable, no distress  Mouth/Throat: Oral mucosa moist  Neck: No JVD  Lungs: Air entry B/L, no rales, no use of accessory muscles  Heart:  S1, S2 heard  GI: soft, non-tender, no guarding  Extremities: no LE edema    Medications:  Infusion:    sodium chloride      sodium bicarbonate infusion 100 mL/hr at 06/14/23 2119     Meds:    sodium chloride flush  5-40 mL IntraVENous 2 times per day    heparin (porcine)  5,000 Units SubCUTAneous 3 times per day    famotidine  20 mg Oral BID    metoprolol tartrate  12.5 mg Oral BID    calcitRIOL  0.5 mcg Oral Daily    Vitamin D  2,000 Units Oral Daily    doxazosin  1 mg Oral Daily     Meds prn: sodium chloride flush, sodium chloride, acetaminophen **OR** acetaminophen     Lab Data :  CBC:   Recent Labs     06/14/23  1316 06/15/23  0615   WBC 7.9 7.2   HGB 10.2* 9.2*   HCT 32.2* 29.5*    124*     CMP:  Recent Labs     06/14/23  1316
[facility]   Nephrology progress note  6/16/2023, 2:30 PM      Pt Name:    Saint Fanning  MRN:     [id number]     YOB: 1945  Admit Date:    6/14/2023 12:39 PM    Chief Complaint: Nephrology following for CKD V. Subjective:  Patient was seen and examined this morning. Feels ok, no complaints. Had some low Bps yesterday. Objective:  24HR INTAKE/OUTPUT:    Intake/Output Summary (Last 24 hours) at 6/16/2023 1430  Last data filed at 6/15/2023 1615  Gross per 24 hour   Intake 400 ml   Output 218 ml   Net 182 ml         I/O last 3 completed shifts: In: 400   Out: 218   No intake/output data recorded.    Admission weight: 197 lb (89.4 kg)  Wt Readings from Last 3 Encounters:   06/16/23 205 lb 4 oz (93.1 kg)   06/14/23 197 lb (89.4 kg)   04/12/23 201 lb (91.2 kg)        Vitals :   Vitals:    06/15/23 2045 06/16/23 0424 06/16/23 0800 06/16/23 1216   BP: 126/76 116/60 134/73 (!) 146/83   Pulse: 83 73 78 72   Resp: 18 18 16 21   Temp: 97.8 °F (36.6 °C) 98 °F (36.7 °C) 98 °F (36.7 °C) 97.9 °F (36.6 °C)   TempSrc: Oral Oral Oral    SpO2: 98% 98% 99% 96%   Weight:    205 lb 4 oz (93.1 kg)   Height:           Physical examination  General Appearance: alert and cooperative with exam, appears comfortable, no distress  Mouth/Throat: Oral mucosa moist  Neck: No JVD  Lungs: Air entry B/L, no rales, no use of accessory muscles  Heart:  S1, S2 heard  GI: soft, non-tender, no guarding  Extremities: no LE edema    Medications:  Infusion:    sodium chloride       Meds:    sodium chloride flush  5-40 mL IntraVENous 2 times per day    heparin (porcine)  5,000 Units SubCUTAneous 3 times per day    famotidine  20 mg Oral BID    metoprolol tartrate  12.5 mg Oral BID    calcitRIOL  0.5 mcg Oral Daily    Vitamin D  2,000 Units Oral Daily     Meds prn: loperamide, sodium chloride flush, sodium chloride, acetaminophen **OR** acetaminophen     Lab Data :  CBC:   Recent Labs     06/14/23  131
[facility]   Nephrology progress note  6/17/2023, 1:35 PM      Pt Name:    Claudell Crafts  MRN:     [id number]     YOB: 1945  Admit Date:    6/14/2023 12:39 PM    Chief Complaint: Nephrology following for CKD V. Subjective:  Patient was seen and examined this morning. He feels ok. Wants to go home. Had some bleeding noted from dialysis catheter which has stopped. Objective:  24HR INTAKE/OUTPUT:    Intake/Output Summary (Last 24 hours) at 6/17/2023 1335  Last data filed at 6/17/2023 0807  Gross per 24 hour   Intake 610 ml   Output 400 ml   Net 210 ml         I/O last 3 completed shifts:   In: 600 [P.O.:200]  Out: 400   I/O this shift:  In: 10 [I.V.:10]  Out: -    Admission weight: 197 lb (89.4 kg)  Wt Readings from Last 3 Encounters:   06/16/23 205 lb 4 oz (93.1 kg)   06/14/23 197 lb (89.4 kg)   04/12/23 201 lb (91.2 kg)        Vitals :   Vitals:    06/16/23 2004 06/17/23 0427 06/17/23 0804 06/17/23 1102   BP: 105/78 (!) 123/53 118/71 110/68   Pulse: 78 80 81 (!) 40   Resp: 18 16 16 15   Temp: 98.2 °F (36.8 °C) 98.2 °F (36.8 °C) 98.7 °F (37.1 °C)    TempSrc: Oral Oral Oral Oral   SpO2: 97% 94% 94% 97%   Weight:       Height:           Physical examination  General Appearance: alert and cooperative with exam, appears comfortable, no distress  Mouth/Throat: Oral mucosa moist  Neck: No JVD, right neck tunneled HD cath with dressing  Lungs: Air entry B/L, no rales, no use of accessory muscles  Heart:  S1, S2 heard  GI: soft, non-tender, no guarding  Extremities: no LE edema    Medications:  Infusion:    sodium chloride       Meds:    sodium chloride flush  5-40 mL IntraVENous 2 times per day    heparin (porcine)  5,000 Units SubCUTAneous 3 times per day    famotidine  20 mg Oral BID    metoprolol tartrate  12.5 mg Oral BID    calcitRIOL  0.5 mcg Oral Daily    Vitamin D  2,000 Units Oral Daily     Meds prn: loperamide, sodium chloride flush, sodium chloride, acetaminophen
Patient provided with discharge instructions, states verbal understanding, patient alert oriented and stable. Ready for discharge.
Pt was in bed as his wife was with him. He was dealing with acute kidney injury superimposed on CKD. He was hopeful and was anointed.     06/15/23 1505   Encounter Summary   Encounter Overview/Reason  Initial Encounter   Service Provided For: Patient and family together   Referral/Consult From: [facility]   Support System Spouse   Last Encounter  06/15/23  (Anointed)   Complexity of Encounter Low   Begin Time 1320   End Time  1326   Total Time Calculated 6 min   Spiritual/Emotional needs   Type Spiritual Support   Rituals, Rites and Sacraments   Type Anointing   Assessment/Intervention/Outcome   Assessment Hopeful   Intervention Empowerment
Status: ongoing assessment. Diet: ADULT DIET; Regular; Low Sodium (2 gm); 2000 ml  IVFs: Sodium bicarbonate in D5 at 50ml/h  DVT prophylaxis: Heparin SQ  Code Status: Full Code  Disposition: admit to med/surg telemetry     Thank you PAULETTE Solomon - BATSHEVA for the opportunity to be involved in this patient's care.     Electronically signed by Dee Molina MD, [id number] on 6/17/2023 at 11:26 AM.

## 2023-06-19 ENCOUNTER — CARE COORDINATION (OUTPATIENT)
Dept: CASE MANAGEMENT | Age: 78
End: 2023-06-19

## 2023-06-19 NOTE — CARE COORDINATION
Care Transitions Outreach Attempt    Call within 2 business days of discharge: Yes     Patient: Claudell Crafts Patient : 1945 MRN: <[id number]>    Last Discharge  [address]       Date Complaint Diagnosis Description Type Department Provider    23 Fatigue Acute renal failure with other specified pathological kidney lesion superimposed on stage 5 chronic kidney disease, not on chronic dialysis [facility]) ED to Hosp-Admission (Discharged) (ADMITTED) HENOK 8B Rubi Bernard MD; Kayy Ibarra... Discharge Facility: [facility]    Noted following upcoming appointments from discharge chart review:   [facility] No future appointments. 1st attempt to reach for Care Transition discharge call unsuccessful. HIPAA compliant message left requesting call back.  [address] [place] [address] [phone]

## 2023-06-20 ENCOUNTER — CARE COORDINATION (OUTPATIENT)
Dept: CASE MANAGEMENT | Age: 78
End: 2023-06-20

## 2023-06-20 NOTE — CARE COORDINATION
Care Transitions Outreach Attempt    Call within 2 business days of discharge: Yes   Patient: Augustine Hassan Patient : 1945 MRN: <[id number]>    Last Discharge  Street       Date Complaint Diagnosis Description Type Department Provider    23 Fatigue Acute renal failure with other specified pathological kidney lesion superimposed on stage 5 chronic kidney disease, not on chronic dialysis [facility]) ED to Hosp-Admission (Discharged) (ADMITTED) STRZ 8B Liv Madsen MD; Yamileth Lin... Discharge Facility: [facility]    Noted following upcoming appointments from discharge chart review:   [facility] follow up appointment(s): No future appointments. 2nd unsuccessful attempt to reach for Care Transition discharge call. HIPAA compliant message left requesting call back. Episode closed per protocol, no further outreach scheduled.

## 2023-07-06 ENCOUNTER — HOSPITAL ENCOUNTER (OUTPATIENT)
Dept: INTERVENTIONAL RADIOLOGY/VASCULAR | Age: 78
Discharge: HOME | End: 2023-07-06
Payer: MEDICARE

## 2023-07-06 VITALS
SYSTOLIC BLOOD PRESSURE: 150 MMHG | DIASTOLIC BLOOD PRESSURE: 75 MMHG | BODY MASS INDEX: 23.62 KG/M2 | RESPIRATION RATE: 20 BRPM | HEART RATE: 74 BPM | TEMPERATURE: 97.5 F | WEIGHT: 189 LBS | OXYGEN SATURATION: 95 %

## 2023-07-06 LAB
DEPRECATED RDW RBC AUTO: 51.8 FL (ref 35–45)
ERYTHROCYTE [DISTWIDTH] IN BLOOD BY AUTOMATED COUNT: 15.1 % (ref 11.5–14.5)
HCT VFR BLD AUTO: 34.5 % (ref 42–52)
HGB BLD-MCNC: 10.4 GM/DL (ref 14–18)
MCH RBC QN AUTO: 29.5 PG (ref 26–33)
MCHC RBC AUTO-ENTMCNC: 30.1 GM/DL (ref 32.2–35.5)
MCV RBC AUTO: 98 FL (ref 80–94)
PLATELET # BLD AUTO: 191 THOU/MM3 (ref 130–400)
PMV BLD AUTO: 9.8 FL (ref 9.4–12.4)
RBC # BLD AUTO: 3.52 MILL/MM3 (ref 4.7–6.1)
WBC # BLD AUTO: 7.7 THOU/MM3 (ref 4.8–10.8)

## 2023-07-06 PROCEDURE — 6360000002 HC RX W HCPCS: Performed by: RADIOLOGY

## 2023-07-06 PROCEDURE — 49418 INSERT TUN IP CATH PERC: CPT

## 2023-07-06 PROCEDURE — 85027 COMPLETE CBC AUTOMATED: CPT

## 2023-07-06 PROCEDURE — 6360000004 HC RX CONTRAST MEDICATION: Performed by: RADIOLOGY

## 2023-07-06 PROCEDURE — 2580000003 HC RX 258: Performed by: RADIOLOGY

## 2023-07-06 RX ORDER — MIDAZOLAM HYDROCHLORIDE 1 MG/ML
1 INJECTION INTRAMUSCULAR; INTRAVENOUS ONCE
Status: DISCONTINUED | OUTPATIENT
Start: 2023-07-06 | End: 2023-07-07 | Stop reason: HOSPADM

## 2023-07-06 RX ORDER — SODIUM CHLORIDE 450 MG/100ML
INJECTION, SOLUTION INTRAVENOUS CONTINUOUS
Status: DISCONTINUED | OUTPATIENT
Start: 2023-07-06 | End: 2023-07-07 | Stop reason: HOSPADM

## 2023-07-06 RX ORDER — MIDAZOLAM HYDROCHLORIDE 1 MG/ML
INJECTION INTRAMUSCULAR; INTRAVENOUS PRN
Status: COMPLETED | OUTPATIENT
Start: 2023-07-06 | End: 2023-07-06

## 2023-07-06 RX ADMIN — HYDROMORPHONE HYDROCHLORIDE 0.5 MG: 1 INJECTION, SOLUTION INTRAMUSCULAR; INTRAVENOUS; SUBCUTANEOUS at 10:59

## 2023-07-06 RX ADMIN — MIDAZOLAM 0.5 MG: 1 INJECTION INTRAMUSCULAR; INTRAVENOUS at 11:06

## 2023-07-06 RX ADMIN — SODIUM CHLORIDE: 4.5 INJECTION, SOLUTION INTRAVENOUS at 09:43

## 2023-07-06 RX ADMIN — MIDAZOLAM 0.5 MG: 1 INJECTION INTRAMUSCULAR; INTRAVENOUS at 10:59

## 2023-07-06 RX ADMIN — IOPAMIDOL 10 ML: 612 INJECTION, SOLUTION INTRAVENOUS at 11:29

## 2023-07-06 RX ADMIN — Medication 2000 MG: at 09:53

## 2023-07-06 RX ADMIN — HYDROMORPHONE HYDROCHLORIDE 0.5 MG: 1 INJECTION, SOLUTION INTRAMUSCULAR; INTRAVENOUS; SUBCUTANEOUS at 11:06

## 2023-07-06 ASSESSMENT — PAIN DESCRIPTION - LOCATION: LOCATION: ABDOMEN

## 2023-07-06 ASSESSMENT — PAIN SCALES - GENERAL
PAINLEVEL_OUTOF10: 0

## 2023-07-06 ASSESSMENT — PAIN DESCRIPTION - DESCRIPTORS: DESCRIPTORS: ACHING

## 2023-07-06 ASSESSMENT — PAIN - FUNCTIONAL ASSESSMENT: PAIN_FUNCTIONAL_ASSESSMENT: 0-10

## 2023-07-06 NOTE — OP NOTE
Department of Radiology  Post Procedure Progress Note      Pre-Procedure Diagnosis:  Renal Failure    Procedure Performed: CAPD Catheter insertion     Anesthesia: local , versed and dilaudid    Findings: successful    Immediate Complications:  None    Estimated Blood Loss: minimal    SEE DICTATED PROCEDURE NOTE FOR COMPLETE DETAILS.       Ranulfo Parra MD   7/6/2023 11:29 AM

## 2023-07-06 NOTE — H&P
[facility]  Sedation/Analgesia History & Physical    Pt Name: Shaq Mata  MRN: [id number]  YOB: 1945  Provider Performing Procedure: Raquel Jones MD, MD  Primary Care Physician: PAULETTE Chan CNP    Formulation and discussion of sedation / procedure plans, risks, benefits, side effects and alternatives with patient and/or responsible adult completed. PRE-PROCEDURE   DNR-CCA/DNR-CC []Yes [x]No  Brief History/Pre-Procedure Diagnosis: Renal failure           MEDICAL HISTORY  []CAD/Valve  []Liver Disease  []Lung Disease []Diabetes  []Hypertension []Renal Disease  []Additional information:       has a past medical history of Chronic kidney disease, Erythrocytosis, Gout, Hemodialysis patient ([address]), Hyperlipidemia, Hypertension, Prerenal azotemia, Proteinuria, and Vitamin D deficiency. SURGICAL HISTORY   has a past surgical history that includes back surgery; Abdomen surgery; skin biopsy; eye surgery; other surgical history (Left, 5/20/2015); and Colon surgery.   Additional information:       ALLERGIES   Allergies as of 07/06/2023    (No Known Allergies)     Additional information:       MEDICATIONS   Coumadin Use Last 5 Days [x]No []Yes  Antiplatelet drug therapy use last 5 days  [x]No []Yes  Other anticoagulant use last 5 days  [x]No []Yes    Current Outpatient Medications:     ALLOPURINOL PO, Take by mouth daily, Disp: , Rfl:     metoprolol tartrate (LOPRESSOR) 25 MG tablet, Take 0.5 tablets by mouth 2 times daily (Patient not taking: Reported on 7/6/2023), Disp: 60 tablet, Rfl: 0    calcitRIOL (ROCALTROL) 0.25 MCG capsule, Take 2 capsules by mouth daily (Patient not taking: Reported on 7/6/2023), Disp: 90 capsule, Rfl: 3    doxazosin (CARDURA) 1 MG tablet, Take 1 tablet by mouth daily (Patient not taking: Reported on 7/6/2023), Disp: 90 tablet, Rfl: 3    sodium bicarbonate 650 MG tablet, Take 2 tablets by mouth 3 times daily (Patient not taking: Reported on 7/6/2023),
Formulation and discussion of sedation / procedure plans, risks, benefits, side effects and alternatives with patient and/or responsible adult completed. History and Physical reviewed and unchanged.     Electronically signed by Edvin Gaines MD on 7/6/23 at 10:27 AM EDT
Formulation and discussion of sedation / procedure plans, risks, benefits, side effects and alternatives with patient and/or responsible adult completed. History and Physical reviewed and unchanged.     Electronically signed by Melani Marte MD on 7/6/23 at 10:57 AM EDT
Disp: 540 tablet, Rfl: 3    Cholecalciferol (VITAMIN D) 2000 units CAPS capsule, Take 2,000 Units by mouth daily, Disp: , Rfl:     Omega-3 Fatty Acids (FISH OIL) 1000 MG CAPS, Take 3 capsules by mouth daily, Disp: , Rfl:     Current Facility-Administered Medications:     0.45 % sodium chloride infusion, , IntraVENous, Continuous, Leigh Harding MD, Last Rate: 20 mL/hr at 07/06/23 0943, New Bag at 07/06/23 0943    HYDROmorphone (DILAUDID) injection 1 mg, 1 mg, IntraVENous, Once, Leigh Harding MD    iohexol (OMNIPAQUE 240) IV/PO solution 50 mL, 50 mL, Other, ONCE PRN, Leigh Harding MD    midazolam (VERSED) injection 1 mg, 1 mg, IntraVENous, Once, Leigh Harding MD  Prior to Admission medications    Medication Sig Start Date End Date Taking?  Authorizing Provider   ALLOPURINOL PO Take by mouth daily   Yes Historical Provider, MD   metoprolol tartrate (LOPRESSOR) 25 MG tablet Take 0.5 tablets by mouth 2 times daily  Patient not taking: Reported on 7/6/2023 6/17/23   Anjel Isbell MD   calcitRIOL (ROCALTROL) 0.25 MCG capsule Take 2 capsules by mouth daily  Patient not taking: Reported on 7/6/2023 3/8/23   Deepak Garcia MD   doxazosin (CARDURA) 1 MG tablet Take 1 tablet by mouth daily  Patient not taking: Reported on 7/6/2023 3/8/23   Deepak Garcia MD   sodium bicarbonate 650 MG tablet Take 2 tablets by mouth 3 times daily  Patient not taking: Reported on 7/6/2023 2/15/23   Deepak Garcia MD   Cholecalciferol (VITAMIN D) 2000 units CAPS capsule Take 2,000 Units by mouth daily    Historical Provider, MD   Omega-3 Fatty Acids (FISH OIL) 1000 MG CAPS Take 3 capsules by mouth daily    Historical Provider, MD     Additional information:       VITAL SIGNS   Vitals:    07/06/23 1051   BP: (!) 140/69   Pulse: 69   Resp: 18   Temp:    SpO2: 98%       PHYSICAL:   Heart:  [x]Regular rate and rhythm  []Other:    Lungs:  [x]Clear    []Other:    Abdomen: [x]Soft    []Other:    Mental Status: [x]Alert &

## 2023-07-06 NOTE — PROGRESS NOTES
0915 PT ADMITTED TO [facility] PER AMBULATION FOR A PD CATHETER INSERTION. PT DENIES BEING ON ANY BLOOD THINNERS. FAMILY AT BEDSIDE. PT RIGHTS AND RESPONSIBILITIES OFFERED TO PT. Pt does have a dialysis catheter to the right chest area. [address] [facility] lab sent. 1022 pt taken per cart to special procedures. 1145 pt return to floor. Alert. Resp even and easy. Abdomen dressing dry and intact. Denies pain at present. [address] [place] BEING TAKEN IN. FAMILY AT BEDSIDE. 1245 pt denies any pain, or  nausea at present. Abdominal dressing remains dry and intact. No bleeding, swelling noted. Clamps given to patient. Resp even and easy. Discharge instructions given to patient. Verbalize understanding. [phone] discharge per wheelchair to home with family.                          __M__ Safety:       (Environmental)  [place] to environment  Ensure ID band is correct and in place/ allergy band as needed  Assess for fall risk  Initiate fall precautions as applicable (fall band, side rails, etc.)  Call light within reach  Bed in low position/ wheels locked    __MM__ Pain:       Assess pain level and characteristics  Administer analgesics as ordered  Assess effectiveness of pain management and report to MD as needed    ___M_ Knowledge Deficit:  Assess baseline knowledge  Provide teaching at level of understanding  Provide teaching via preferred learning method  Evaluate teaching effectiveness    __M__ Hemodynamic/Respiratory Status:       (Pre and Post Procedure Monitoring)  Assess/Monitor vital signs and LOC  Assess Baseline SpO2 prior to any sedation  Obtain weight/height  Assess vital signs/ LOC until patient meets discharge criteria  Monitor procedure site and notify MD of any issues

## 2023-07-06 NOTE — DISCHARGE INSTRUCTIONS
how to care for myself/the patient at home. i know who to call if Ibrahima Nguyen question or concerns. The adult responsible for my discharge care is:wife, louis       You have received the sedation/analgesia medications/s: versed, dilaudid      IF YOU REPORT TO AN EMERGENCY ROOM, DOCTOR'S OFFICE OR HOSPITAL WITHIN 24 HRS AFTER PROCEDURE, BRING THIS SHEET WITH YOU AND GIVE IT THE PHYSICIAN OR NURSE ATTENDING YOU. [facility] CAPD DISCHARGE INFORMATION    Report to [facility] any of the following:     Bleeding   Fever   Vomiting   Sever cough   Sever pain   Wet or dirty/soiled dressing   Dressing falls off      [facility] phone numbers [address] [phone]. We are open Monday-Friday 8-4:30. After hours call the office number [phone]. A nurse is on call when the office is closed. You then need to leave you phone number, a short message, and the nurse will call you back. If you have a critical emergency call 911. The surgical dressing should be left in place. The dressing should only be changed by [facility]. Do not shower until advised by the dialysis clinic. Sponge bath only. No tub baths. Keep dressing dry. Avoid heavy lifting, stair climbing, straining and constipation. Your activities for the next few weeks should be light. Resume all routine medications and diet unless otherwise instructed by you doctor. Please continue taking the Fibercon  one tablet three times a day and miralax 17 grams a day as prescribed by your doctor. Two blue clamps will be given to you after surgery. These should  be applied to the catheter if leaking occurs or catheter comes apart. Call [facility]. Do not use anything else on your catheter. NO PINS OR SHARP OBJECTS NEAR THE CATHETER. For persistent pain call physician answering service at [phone].     Your follow up appointment is for :

## 2023-07-06 NOTE — PROGRESS NOTES
1026 Patient received in IR for PD catheter insertion. 1031 This procedure has been fully reviewed with the patient and written informed consent has been obtained. [id number] Patient assisted to procedure table and monitor applied. 1105 Procedure started with Dr. Tiffani Hunter. 1128 Procedure completed; patient tolerated well. Suture, dermabond,  guaze, and op-site applied to incision; no bleeding noted. Split guaze, guaze, and op-site applied to tube exit site. 1135 Patient on cart; comfort ensured. 1136 Patient taken to [facility] via transport. Report called to [facility] in OPN.

## 2023-07-26 ENCOUNTER — HOSPITAL ENCOUNTER (OUTPATIENT)
Dept: INTERVENTIONAL RADIOLOGY/VASCULAR | Age: 78
Discharge: HOME | End: 2023-07-26

## 2023-07-26 NOTE — PROGRESS NOTES
1135 Sutures in mid abdominal incision removed. Sites without redness, swelling or drainage. 2 x 2 with op-site dressing applied.

## 2023-08-25 ENCOUNTER — HOSPITAL ENCOUNTER (OUTPATIENT)
Dept: INTERVENTIONAL RADIOLOGY/VASCULAR | Age: 78
Discharge: HOME | End: 2023-08-25
Payer: MEDICARE

## 2023-08-25 VITALS
DIASTOLIC BLOOD PRESSURE: 96 MMHG | RESPIRATION RATE: 18 BRPM | SYSTOLIC BLOOD PRESSURE: 165 MMHG | OXYGEN SATURATION: 96 % | HEART RATE: 87 BPM

## 2023-08-25 DIAGNOSIS — N18.6 ESRD (END STAGE RENAL DISEASE) (HCC): ICD-10-CM

## 2023-08-25 PROCEDURE — 6360000002 HC RX W HCPCS: Performed by: RADIOLOGY

## 2023-08-25 PROCEDURE — 2580000003 HC RX 258: Performed by: RADIOLOGY

## 2023-08-25 PROCEDURE — 6370000000 HC RX 637 (ALT 250 FOR IP): Performed by: RADIOLOGY

## 2023-08-25 RX ORDER — SODIUM CHLORIDE 450 MG/100ML
INJECTION, SOLUTION INTRAVENOUS CONTINUOUS
Status: DISCONTINUED | OUTPATIENT
Start: 2023-08-25 | End: 2023-08-26 | Stop reason: HOSPADM

## 2023-08-25 RX ADMIN — SODIUM CHLORIDE: 4.5 INJECTION, SOLUTION INTRAVENOUS at 09:36

## 2023-08-25 RX ADMIN — Medication 2000 MG: at 09:45

## 2023-08-25 RX ADMIN — MUPIROCIN: 20 OINTMENT TOPICAL at 11:22

## 2023-08-25 ASSESSMENT — PAIN SCALES - GENERAL
PAINLEVEL_OUTOF10: 0

## 2023-08-25 NOTE — DISCHARGE INSTRUCTIONS
DISCHARGE INSTRUCTION SHEET    Diet: As before  Care of catheter site:  Keep dressing clean and dry  Limit activity to surgical site (no pushing, pulling, or lifting) for next 2 days  Sponge bath only for next 5 days - then may shower, remove dressing and leave open to air. Steri strips will fall off on their own - do not remove  No driving today    Return to nearest Emergency Room if any of the following:  Symptoms of bleeding, drainage, swelling  Increase in pain  Elevated temperature over 101 degrees    If you have any problems call your doctor or return to the nearest Emergency Room.

## 2023-08-25 NOTE — PROGRESS NOTES
12 Pt arrived to special procedure room 1 for tunneled hemodialysis catheter removal.   1106 Procedure explained using teach back method. Pt states understanding. 1107 Dr Christensen Lose into assess patient and explain procedure. This procedure has been fully reviewed with the patient and written informed consent has been obtained. 1111 Procedure begins. 1122 Procedure complete. 1123 Patient assisted to semi fowlers position. Comfort ensured. Ice pack applied to right neck. 1126 Patient transported to [facility] in stable condition.

## 2023-08-25 NOTE — PROGRESS NOTES
56 Pt arrives ambulatory with spouse for dialysis catheter removal. Procedure explained and questions answered. PT RIGHTS AND RESPONSIBILITIES OFFERED TO PT. Pt denies taking blood thinners in past 5 days. [address] notified that pt ate at 0630. Ryan states okay to do procedure. 1100 pt to [facility] via bed. 1136 pt back from [facility]. Vitals stable. Dressing on right chest clean, dry and intact. No bleeding drainage redness or swelling noted. Ice pack applied. Pt denies pain. Pt provided with OJ. Spouse at bedside. 1200 pt tolerating OJ. Vitals stable. Site unchanged. 1220 pt discharged ambulatory with instructions with spouse with no complaints. Dressing on right chest, clean dry and intact. No bleeding drainage redness or swelling noted.            __m__ Safety:       (Environmental)  [place] to environment  Ensure ID band is correct and in place/ allergy band as needed  Assess for fall risk  Initiate fall precautions as applicable (fall band, side rails, etc.)  Call light within reach  Bed in low position/ wheels locked    __m__ Pain:       Assess pain level and characteristics  Administer analgesics as ordered  Assess effectiveness of pain management and report to MD as needed    __m__ Knowledge Deficit:  Assess baseline knowledge  Provide teaching at level of understanding  Provide teaching via preferred learning method  Evaluate teaching effectiveness    ___m_ Hemodynamic/Respiratory Status:       (Pre and Post Procedure Monitoring)  Assess/Monitor vital signs and LOC  Assess Baseline SpO2 prior to any sedation  Obtain weight/height  Assess vital signs/ LOC until patient meets discharge criteria  Monitor procedure site and notify MD of any issues

## 2024-04-04 ENCOUNTER — HOSPITAL ENCOUNTER (OUTPATIENT)
Dept: INTERVENTIONAL RADIOLOGY/VASCULAR | Age: 79
Discharge: HOME | End: 2024-04-04
Payer: MEDICARE

## 2024-04-04 VITALS
DIASTOLIC BLOOD PRESSURE: 86 MMHG | HEART RATE: 88 BPM | TEMPERATURE: 96.6 F | WEIGHT: 204 LBS | BODY MASS INDEX: 25.5 KG/M2 | OXYGEN SATURATION: 93 % | RESPIRATION RATE: 16 BRPM | SYSTOLIC BLOOD PRESSURE: 136 MMHG

## 2024-04-04 LAB
DEPRECATED RDW RBC AUTO: 60.1 FL (ref 35–45)
ERYTHROCYTE [DISTWIDTH] IN BLOOD BY AUTOMATED COUNT: 17.2 % (ref 11.5–14.5)
HCT VFR BLD AUTO: 55.1 % (ref 42–52)
HGB BLD-MCNC: 17.6 GM/DL (ref 14–18)
MCH RBC QN AUTO: 32.2 PG (ref 26–33)
MCHC RBC AUTO-ENTMCNC: 31.9 GM/DL (ref 32.2–35.5)
MCV RBC AUTO: 100.9 FL (ref 80–94)
PLATELET # BLD AUTO: 162 THOU/MM3 (ref 130–400)
PMV BLD AUTO: 10.5 FL (ref 9.4–12.4)
RBC # BLD AUTO: 5.46 MILL/MM3 (ref 4.7–6.1)
WBC # BLD AUTO: 10.9 THOU/MM3 (ref 4.8–10.8)

## 2024-04-04 PROCEDURE — 2500000003 HC RX 250 WO HCPCS: Performed by: RADIOLOGY

## 2024-04-04 PROCEDURE — 6360000002 HC RX W HCPCS: Performed by: RADIOLOGY

## 2024-04-04 PROCEDURE — 2709999900 IR FLUORO GUIDED CVA DEVICE PLMT/REPLACE/REMOVAL

## 2024-04-04 PROCEDURE — 36558 INSERT TUNNELED CV CATH: CPT

## 2024-04-04 PROCEDURE — 2580000003 HC RX 258: Performed by: RADIOLOGY

## 2024-04-04 PROCEDURE — 85027 COMPLETE CBC AUTOMATED: CPT

## 2024-04-04 PROCEDURE — 76937 US GUIDE VASCULAR ACCESS: CPT

## 2024-04-04 PROCEDURE — 77001 FLUOROGUIDE FOR VEIN DEVICE: CPT

## 2024-04-04 RX ORDER — LIDOCAINE HYDROCHLORIDE 20 MG/ML
5 INJECTION, SOLUTION INFILTRATION; PERINEURAL ONCE
Status: COMPLETED | OUTPATIENT
Start: 2024-04-04 | End: 2024-04-04

## 2024-04-04 RX ORDER — CALCIUM POLYCARBOPHIL 625 MG 625 MG/1
625 TABLET ORAL DAILY
COMMUNITY

## 2024-04-04 RX ORDER — SODIUM CHLORIDE 450 MG/100ML
INJECTION, SOLUTION INTRAVENOUS CONTINUOUS
Status: DISCONTINUED | OUTPATIENT
Start: 2024-04-04 | End: 2024-04-05 | Stop reason: HOSPADM

## 2024-04-04 RX ORDER — MIDAZOLAM HYDROCHLORIDE 1 MG/ML
1 INJECTION INTRAMUSCULAR; INTRAVENOUS ONCE
Status: COMPLETED | OUTPATIENT
Start: 2024-04-04 | End: 2024-04-04

## 2024-04-04 RX ADMIN — WATER 2000 MG: 1 INJECTION INTRAMUSCULAR; INTRAVENOUS; SUBCUTANEOUS at 09:41

## 2024-04-04 RX ADMIN — HYDROMORPHONE HYDROCHLORIDE 1 MG: 1 INJECTION, SOLUTION INTRAMUSCULAR; INTRAVENOUS; SUBCUTANEOUS at 10:54

## 2024-04-04 RX ADMIN — SODIUM CHLORIDE: 4.5 INJECTION, SOLUTION INTRAVENOUS at 09:40

## 2024-04-04 RX ADMIN — LIDOCAINE HYDROCHLORIDE 5 ML: 20 INJECTION, SOLUTION INFILTRATION; PERINEURAL at 11:15

## 2024-04-04 RX ADMIN — MIDAZOLAM 1 MG: 1 INJECTION INTRAMUSCULAR; INTRAVENOUS at 10:54

## 2024-04-04 ASSESSMENT — PAIN SCALES - GENERAL
PAINLEVEL_OUTOF10: 0

## 2024-04-04 ASSESSMENT — PAIN - FUNCTIONAL ASSESSMENT: PAIN_FUNCTIONAL_ASSESSMENT: 0-10

## 2024-04-04 NOTE — H&P
Formulation and discussion of sedation / procedure plans, risks, benefits, side effects and alternatives with patient and/or responsible adult completed.    History and Physical reviewed and unchanged.    Electronically signed by Armando Schmidt MD on 4/4/24 at 10:46 AM EDT

## 2024-04-04 NOTE — PROGRESS NOTES
0910 pt admitted to \[facility]\"" per ambulation for a dialysis catheter insertion.   Pt denies use of any blood thinners. PT RIGHTS AND RESPONSIBILITIES OFFERED TO PT._ pt states he has a pd catheter but switching to dialysis catheter and possible fistulogram creation in the future.   Pt npo since last son. Sip of water with med at 4 30 am.  Wife, louis at bedside.   0940 lab sent     0955 pt resting quietly in bed.  No concerns noted.   1000 report called to [facility]   1040 pt taken per cart to special   1133 pt return to \[facility]\"" per cart. Alert. Resp even and easy.  Right upper chest dressing dry and intact. No bleeding, swelling noted. Ice pack in place. Denies pain at present.   1145 pt taking drink and muffin in at present.   1200 resting quietly in bed.  Dressing dry. No concerns noted.    1215 pt watching tv.   Right chest dressing dry and intact. No bleeding. Swelling noted. Discharge instructions reviewed with patient and his wife.  No concerns noted.  Questions answered .  Pt states he is to have dialysis next week.  Resp even and easy.    1230 pt up in room. Clothes changed. Gait steady . Pt denies dizzyness.  Eager for discharge.    1235 pt discharge per wheelchair to home with wife.               __m_ Safety:       (Environmental)  [place] to environment  Ensure ID band is correct and in place/ allergy band as needed  Assess for fall risk  Initiate fall precautions as applicable (fall band, side rails, etc.)  Call light within reach  Bed in low position/ wheels locked  m  ____ Pain:       Assess pain level and characteristics  Administer analgesics as ordered  Assess effectiveness of pain management and report to MD as needed    _m___ Knowledge Deficit:  Assess baseline knowledge  Provide teaching at level of understanding  Provide teaching via preferred learning method  Evaluate teaching effectiveness    __m__ Hemodynamic/Respiratory Status:       (Pre and Post Procedure Monitoring)  Assess/Monitor vital signs and

## 2024-04-04 NOTE — H&P
[facility]  Sedation/Analgesia History & Physical    Pt Name: Cruz Tarango  MRN: [id number]  YOB: 1945  Provider Performing Procedure: Armando Schmidt MD, MD  Primary Care Physician: Jessika Trevizo APRN - CNP    Formulation and discussion of sedation / procedure plans, risks, benefits, side effects and alternatives with patient and/or responsible adult completed.    PRE-PROCEDURE   DNR-CCA/DNR-CC []Yes [x]No  Brief History/Pre-Procedure Diagnosis: Renal failure           MEDICAL HISTORY  []CAD/Valve  []Liver Disease  []Lung Disease []Diabetes  []Hypertension []Renal Disease  []Additional information:       has a past medical history of Chronic kidney disease, Erythrocytosis, Gout, Hemodialysis patient (HCC), Hyperlipidemia, Hypertension, Prerenal azotemia, Proteinuria, and Vitamin D deficiency.    SURGICAL HISTORY   has a past surgical history that includes back surgery; Abdomen surgery; skin biopsy; eye surgery; other surgical history (Left, 5/20/2015); and Colon surgery.  Additional information:       ALLERGIES   Allergies as of 04/04/2024    (No Known Allergies)     Additional information:       MEDICATIONS   Coumadin Use Last 5 Days [x]No []Yes  Antiplatelet drug therapy use last 5 days  [x]No []Yes  Other anticoagulant use last 5 days  [x]No []Yes    Current Outpatient Medications:     polycarbophil (FIBERCON) 625 MG tablet, Take 1 tablet by mouth daily, Disp: , Rfl:     ALLOPURINOL PO, Take by mouth daily, Disp: , Rfl:     metoprolol tartrate (LOPRESSOR) 25 MG tablet, Take 0.5 tablets by mouth 2 times daily (Patient not taking: Reported on 7/6/2023), Disp: 60 tablet, Rfl: 0    calcitRIOL (ROCALTROL) 0.25 MCG capsule, Take 2 capsules by mouth daily, Disp: 90 capsule, Rfl: 3    doxazosin (CARDURA) 1 MG tablet, Take 1 tablet by mouth daily, Disp: 90 tablet, Rfl: 3    sodium bicarbonate 650 MG tablet, Take 2 tablets by mouth 3 times daily, Disp: 540 tablet, Rfl: 3    Cholecalciferol

## 2024-04-04 NOTE — DISCHARGE INSTRUCTIONS
DIALYSIS CATHETER DISCHARGE CARE    Diet:  As before    Care of catheter:  Keep dressing clean and dry  Ice to catheter site for next 24 hours (20 minutes every hour)  Limit activity to shoulder (no pushing, pulling, or lifting) for next 3 days  No shower or tub baths until dressing is changed by dialysis nurses    Return to nearest Emergency Room if any of the following:  Symptoms of bleeding, drainage, swelling  Increase in pain  Elevated temperature over 101 degrees    Keep scheduled appointment in dialysis.  Sutures to be removed in 10 days by dialysis nurses.  If you have any problems, call your doctor or return to nearest Emergency Room.          SEDATION/ANALGESIA INFORMATION HOME GOING ADVICE    Review the following information with the patient prior to the procedure.  Sedation/agalgesia is used during short medical procedures under controlled supervision.  The medication will produce a strong relaxation.  You will be able to hear, speak and follow instructions, but you memory and alertness will be decreased.  You will be able to swallow and breathe on your own.  During sedation/analgesia you blood pressure, hear and breathing will be watched closely.  After the procedure, you may not remember what was said or done.    Procedure:dialysis catheter insertion      Date:  4/4/24   You may have the following effects from the medication.  Drowsiness, dizziness, sleepiness or confusion.  Difficulty remembering or delayed reaction times.  Loss of fine muscle control or difficulty with your balance especially while walking.  Difficulty focusing or blurred vision.  You may not be aware of slight changes in your behavior and/or your reaction time because of the medication used during the procedure.  Therefore you should follow these instructions.  Have someone responsible help you with your care.  Do not drive for 24 hours.  Do not operate equipment for 24 hours (lawnmowers, power tools, kitchen accessories,

## 2024-04-04 NOTE — PROGRESS NOTES
1042 Pt arrived to special procedure room 2 for tunneled dialysis catheter insertion under conscious sedation. Patient states his wife remains in [facility].   1043 Procedure explained using teach back method. Pt states understanding.  1045 Dr Schmidt into assess patient and explain procedure. This procedure has been fully reviewed with the patient and written informed consent has been obtained.   1051 Patient assisted to table in supine position. Monitor attached to patient. Comfort ensured.  1105 Pre-procedure images obtained.  1107 Procedure begins.  1116 Procedure complete. Post-procedure images obtained.  1122 Monitor removed. Patient assisted to cart in semi fowlers position. Ice pack applied to right neck/chest. Comfort ensured.  1126 Patient transported to [facility] in stable condition.

## 2024-04-04 NOTE — OP NOTE
Department of Radiology  Post Procedure Progress Note      Pre-Procedure Diagnosis:  Renal Failure    Procedure Performed: Dialysis Catheter insertion     Anesthesia: local , versed and dilaudid    Findings: successful    Immediate Complications:  None    Estimated Blood Loss: minimal    SEE DICTATED PROCEDURE NOTE FOR COMPLETE DETAILS.      Armando Schmidt MD   4/4/2024 11:21 AM

## 2024-04-05 ENCOUNTER — HOSPITAL ENCOUNTER (OUTPATIENT)
Dept: INTERVENTIONAL RADIOLOGY/VASCULAR | Age: 79
End: 2024-04-05
Attending: INTERNAL MEDICINE
Payer: MEDICARE

## 2024-04-05 DIAGNOSIS — Z01.818 PREOP EXAMINATION: ICD-10-CM

## 2024-04-05 DIAGNOSIS — N18.6 END STAGE RENAL DISEASE (HCC): ICD-10-CM

## 2024-04-05 PROCEDURE — 93986 DUP-SCAN HEMO COMPL UNI STD: CPT

## 2024-04-16 ENCOUNTER — HOSPITAL ENCOUNTER (OUTPATIENT)
Dept: INTERVENTIONAL RADIOLOGY/VASCULAR | Age: 79
Discharge: HOME | End: 2024-04-16
Payer: MEDICARE

## 2024-04-16 DIAGNOSIS — N18.6 ESRD (END STAGE RENAL DISEASE) (HCC): ICD-10-CM

## 2024-04-16 PROCEDURE — 99211 OFF/OP EST MAY X REQ PHY/QHP: CPT

## 2024-04-16 ASSESSMENT — PAIN SCALES - GENERAL
PAINLEVEL_OUTOF10: 0
PAINLEVEL_OUTOF10: 0

## 2024-04-16 NOTE — PROGRESS NOTES
Dr Schmidt states patient is a candidate for fistula creation.  notified to contact rep Suarez once order is received and patient is scheduled.

## 2024-04-16 NOTE — PROGRESS NOTES
1413 Pt arrived to special procedure room 2 for fistula creation consult. Wife present at side.   1414 Procedure explained using teach back method. Pt states understanding.  1416 Dr Schmidt into assess patient and explain procedure.  1422 Images obtained per Dr Schmidt.  1428 Patient ambulated to discharge lobby in stable condition. Wife and writer at side.

## 2024-04-19 ENCOUNTER — COMMUNITY CARE MANAGEMENT (OUTPATIENT)
Facility: CLINIC | Age: 79
End: 2024-04-19

## 2024-04-25 ENCOUNTER — HOSPITAL ENCOUNTER (OUTPATIENT)
Dept: INTERVENTIONAL RADIOLOGY/VASCULAR | Age: 79
Discharge: HOME | End: 2024-04-25
Payer: MEDICARE

## 2024-04-25 VITALS
DIASTOLIC BLOOD PRESSURE: 70 MMHG | TEMPERATURE: 98 F | HEART RATE: 78 BPM | RESPIRATION RATE: 16 BRPM | SYSTOLIC BLOOD PRESSURE: 135 MMHG | OXYGEN SATURATION: 98 %

## 2024-04-25 PROCEDURE — 2709999900 IR FLUORO GUIDED CVA DEVICE PLMT/REPLACE/REMOVAL

## 2024-04-25 PROCEDURE — 49422 REMOVE TUNNELED IP CATH: CPT

## 2024-04-25 PROCEDURE — 2500000003 HC RX 250 WO HCPCS: Performed by: RADIOLOGY

## 2024-04-25 PROCEDURE — 6360000002 HC RX W HCPCS: Performed by: RADIOLOGY

## 2024-04-25 PROCEDURE — 2580000003 HC RX 258: Performed by: RADIOLOGY

## 2024-04-25 RX ORDER — MIDAZOLAM HYDROCHLORIDE 1 MG/ML
1 INJECTION INTRAMUSCULAR; INTRAVENOUS ONCE
Status: COMPLETED | OUTPATIENT
Start: 2024-04-25 | End: 2024-04-25

## 2024-04-25 RX ORDER — SODIUM CHLORIDE 450 MG/100ML
INJECTION, SOLUTION INTRAVENOUS CONTINUOUS
Status: DISCONTINUED | OUTPATIENT
Start: 2024-04-25 | End: 2024-04-26 | Stop reason: HOSPADM

## 2024-04-25 RX ADMIN — SODIUM CHLORIDE: 4.5 INJECTION, SOLUTION INTRAVENOUS at 07:01

## 2024-04-25 RX ADMIN — WATER 2000 MG: 1 INJECTION INTRAMUSCULAR; INTRAVENOUS; SUBCUTANEOUS at 06:55

## 2024-04-25 RX ADMIN — MIDAZOLAM 1 MG: 1 INJECTION INTRAMUSCULAR; INTRAVENOUS at 08:08

## 2024-04-25 RX ADMIN — LIDOCAINE HYDROCHLORIDE 20 ML: 20 INJECTION, SOLUTION INFILTRATION; PERINEURAL at 08:28

## 2024-04-25 RX ADMIN — HYDROMORPHONE HYDROCHLORIDE 1 MG: 1 INJECTION, SOLUTION INTRAMUSCULAR; INTRAVENOUS; SUBCUTANEOUS at 08:08

## 2024-04-25 ASSESSMENT — PAIN SCALES - GENERAL
PAINLEVEL_OUTOF10: 0

## 2024-04-25 NOTE — H&P
[facility]  Sedation/Analgesia History & Physical    Pt Name: Cruz Tarango  MRN: [id number]  YOB: 1945  Provider Performing Procedure: Armando Schmidt MD, MD  Primary Care Physician: Jessika Trevizo APRN - CNP    Formulation and discussion of sedation / procedure plans, risks, benefits, side effects and alternatives with patient and/or responsible adult completed.    PRE-PROCEDURE   DNR-CCA/DNR-CC []Yes [x]No  Brief History/Pre-Procedure Diagnosis: Renal failure           MEDICAL HISTORY  []CAD/Valve  []Liver Disease  []Lung Disease []Diabetes  []Hypertension []Renal Disease  []Additional information:       has a past medical history of Chronic kidney disease, Erythrocytosis, Gout, Hemodialysis patient (HCC), Hyperlipidemia, Hypertension, Prerenal azotemia, Proteinuria, and Vitamin D deficiency.    SURGICAL HISTORY   has a past surgical history that includes back surgery; Abdomen surgery; skin biopsy; eye surgery; other surgical history (Left, 5/20/2015); and Colon surgery.  Additional information:       ALLERGIES   Allergies as of 04/25/2024    (No Known Allergies)     Additional information:       MEDICATIONS   Coumadin Use Last 5 Days [x]No []Yes  Antiplatelet drug therapy use last 5 days  [x]No []Yes  Other anticoagulant use last 5 days  [x]No []Yes    Current Outpatient Medications:     polycarbophil (FIBERCON) 625 MG tablet, Take 1 tablet by mouth daily, Disp: , Rfl:     ALLOPURINOL PO, Take by mouth daily, Disp: , Rfl:     calcitRIOL (ROCALTROL) 0.25 MCG capsule, Take 2 capsules by mouth daily, Disp: 90 capsule, Rfl: 3    sodium bicarbonate 650 MG tablet, Take 2 tablets by mouth 3 times daily, Disp: 540 tablet, Rfl: 3    Cholecalciferol (VITAMIN D) 2000 units CAPS capsule, Take 2,000 Units by mouth daily, Disp: , Rfl:     Omega-3 Fatty Acids (FISH OIL) 1000 MG CAPS, Take 3 capsules by mouth daily, Disp: , Rfl:     Current Facility-Administered Medications:     0.45 % sodium

## 2024-04-25 NOTE — DISCHARGE INSTRUCTIONS
Sutures will dissolve on their own  Take gauze and clear tape off after 5 days. TAKE OFF TUESDAY      INSERTION/REMOVAL OF CAPD/MEDIPORT/POWER PORT DISCHARGE INSTRUCTION SHEET    Diet: As before  Care of catheter site:  Keep dressing clean and dry  Ice to catheter site for next 4 hours (20 minutes every hour)  Limit activity to surgical site (no pushing, pulling, or lifting) for next 2 days  Sponge bath only for next 5 days - then may shower, remove dressing and leave open to air.  Steri strips will fall off on their own - do not remove  No driving today    Return to nearest Emergency Room if any of the following:  Symptoms of bleeding, drainage, swelling  Increase in pain  Elevated temperature over 101 degrees    If you have any problems call your doctor or return to the nearest Emergency Room.               SEDATION/ANALGESIA INFORMATION HOME GOING ADVICE    Review the following information with the patient prior to the procedure.  Sedation/agalgesia is used during short medical procedures under controlled supervision.  The medication will produce a strong relaxation.  You will be able to hear, speak and follow instructions, but you memory and alertness will be decreased.  You will be able to swallow and breathe on your own.  During sedation/analgesia you blood pressure, hear and breathing will be watched closely.  After the procedure, you may not remember what was said or done.    Procedure:      Date:  You may have the following effects from the medication.  Drowsiness, dizziness, sleepiness or confusion.  Difficulty remembering or delayed reaction times.  Loss of fine muscle control or difficulty with your balance especially while walking.  Difficulty focusing or blurred vision.  You may not be aware of slight changes in your behavior and/or your reaction time because of the medication used during the procedure.  Therefore you should follow these instructions.  Have someone responsible help you with your

## 2024-04-25 NOTE — H&P
Formulation and discussion of sedation / procedure plans, risks, benefits, side effects and alternatives with patient and/or responsible adult completed.    History and Physical reviewed and unchanged.    Electronically signed by Armando Schmidt MD on 4/25/24 at 7:58 AM EDT

## 2024-04-25 NOTE — PROGRESS NOTES
0739 Patient received in IR for peritoneal catheter removal   0745 This procedure has been fully reviewed with the patient and written informed consent has been obtained.  0807 Procedure started with   0822 PD catheter removed   0838 Procedure completed; patient tolerated well. Site closed with surgicel, suture, Dermabond, gauze and opsite no bleeding noted.  0848 Patient on cart; comfort ensured.  0851 Patient taken to OPN via transport. Report to OPN

## 2024-04-25 NOTE — PROGRESS NOTES
0630  Cruz ambulated into room with Lorie (wife) for peritoneal dialysis catheter removal. Pt rights and responsibilities offered to pt.   Procedure explained and questions were answered. Cruz states he does not take any blood thinners. Lorie is his  today. He states he does not have any pain at this time.   No labs were ordered at this time and atb given. Iv started and call light within reach. He has been NPO for over 4 hours.     0734  Cruz was picked up for procedure. Lorie stayed in the room in OPN.     0900  dressings clean dry intact. Cruz returned from procedure. He states no pain. No nausea. He was offered drink and snack. Call light within reach.     0915  no changes at this time. Cruz is tolerating food and drink. No needs at this time.     0930  Dressings clean dry intact x2. Lorie at bedside and Cruz has call light within reach. Cruz is awake and talking with Lorie.     0945 no needs nor changes at this time. Dressings clean dry intact. Call light within reach.     1000  dressings clean dry intact, Cruz states no pain at this time. He is able to get dressed well.  D/c instructions explained and Cruz and Lorie verbalized understanding. Cruz was wheeled out via wheelchair for d/c with Lorie today.       __m__ Safety:       (Environmental)  [place] to environment  Ensure ID band is correct and in place/ allergy band as needed  Assess for fall risk  Initiate fall precautions as applicable (fall band, side rails, etc.)  Call light within reach  Bed in low position/ wheels locked    _m___ Pain:       Assess pain level and characteristics  Administer analgesics as ordered  Assess effectiveness of pain management and report to MD as needed    _m___ Knowledge Deficit:  Assess baseline knowledge  Provide teaching at level of understanding  Provide teaching via preferred learning method  Evaluate teaching effectiveness    __m__ Hemodynamic/Respiratory Status:       (Pre and Post Procedure

## 2024-04-25 NOTE — OP NOTE
Department of Radiology  Post Procedure Progress Note      Pre-Procedure Diagnosis:  CAPD cath not working well ..going to fistula     Procedure Performed:  CAPD cath removal     Anesthesia: local / versed and dilaudid     Findings: successful    Immediate Complications:  None    Estimated Blood Loss: minimal    SEE DICTATED PROCEDURE NOTE FOR COMPLETE DETAILS.    Armando Schmidt MD   4/25/2024 8:39 AM

## 2024-04-29 ENCOUNTER — HOSPITAL ENCOUNTER (OUTPATIENT)
Dept: INTERVENTIONAL RADIOLOGY/VASCULAR | Age: 79
Discharge: HOME | End: 2024-04-29
Attending: RADIOLOGY | Admitting: RADIOLOGY
Payer: MEDICARE

## 2024-04-29 VITALS
DIASTOLIC BLOOD PRESSURE: 85 MMHG | SYSTOLIC BLOOD PRESSURE: 160 MMHG | TEMPERATURE: 97.7 F | RESPIRATION RATE: 19 BRPM | HEART RATE: 85 BPM | OXYGEN SATURATION: 94 % | WEIGHT: 205.91 LBS | BODY MASS INDEX: 25.6 KG/M2 | HEIGHT: 75 IN

## 2024-04-29 DIAGNOSIS — N18.6 ESRD (END STAGE RENAL DISEASE) (HCC): Primary | ICD-10-CM

## 2024-04-29 DIAGNOSIS — N18.4 CKD (CHRONIC KIDNEY DISEASE), STAGE IV (HCC): ICD-10-CM

## 2024-04-29 DIAGNOSIS — N19 RENAL FAILURE SYNDROME: ICD-10-CM

## 2024-04-29 LAB
APTT PPP: 28.4 SECONDS (ref 22–38)
DEPRECATED RDW RBC AUTO: 59.1 FL (ref 35–45)
ERYTHROCYTE [DISTWIDTH] IN BLOOD BY AUTOMATED COUNT: 15.9 % (ref 11.5–14.5)
HCT VFR BLD AUTO: 46.3 % (ref 42–52)
HGB BLD-MCNC: 14.8 GM/DL (ref 14–18)
INR PPP: 0.88 (ref 0.85–1.13)
MCH RBC QN AUTO: 32.3 PG (ref 26–33)
MCHC RBC AUTO-ENTMCNC: 32 GM/DL (ref 32.2–35.5)
MCV RBC AUTO: 101.1 FL (ref 80–94)
PLATELET # BLD AUTO: 157 THOU/MM3 (ref 130–400)
PMV BLD AUTO: 9.8 FL (ref 9.4–12.4)
RBC # BLD AUTO: 4.58 MILL/MM3 (ref 4.7–6.1)
WBC # BLD AUTO: 8.5 THOU/MM3 (ref 4.8–10.8)

## 2024-04-29 PROCEDURE — 85610 PROTHROMBIN TIME: CPT

## 2024-04-29 PROCEDURE — 7100000010 HC PHASE II RECOVERY - FIRST 15 MIN: Performed by: RADIOLOGY

## 2024-04-29 PROCEDURE — 2500000003 HC RX 250 WO HCPCS: Performed by: RADIOLOGY

## 2024-04-29 PROCEDURE — 6360000002 HC RX W HCPCS: Performed by: RADIOLOGY

## 2024-04-29 PROCEDURE — 85730 THROMBOPLASTIN TIME PARTIAL: CPT

## 2024-04-29 PROCEDURE — 6370000000 HC RX 637 (ALT 250 FOR IP): Performed by: RADIOLOGY

## 2024-04-29 PROCEDURE — 6360000004 HC RX CONTRAST MEDICATION: Performed by: RADIOLOGY

## 2024-04-29 PROCEDURE — 85027 COMPLETE CBC AUTOMATED: CPT

## 2024-04-29 PROCEDURE — 36836 PRQ AV FSTL CRTJ UXTR 1 ACS: CPT

## 2024-04-29 PROCEDURE — 7100000011 HC PHASE II RECOVERY - ADDTL 15 MIN: Performed by: RADIOLOGY

## 2024-04-29 PROCEDURE — 2709999900 IR PERC ARTERIOVENOUS FISTULA CREATION

## 2024-04-29 PROCEDURE — 36415 COLL VENOUS BLD VENIPUNCTURE: CPT

## 2024-04-29 PROCEDURE — 2580000003 HC RX 258: Performed by: RADIOLOGY

## 2024-04-29 RX ORDER — SODIUM CHLORIDE 450 MG/100ML
INJECTION, SOLUTION INTRAVENOUS CONTINUOUS
Status: DISCONTINUED | OUTPATIENT
Start: 2024-04-29 | End: 2024-04-29 | Stop reason: HOSPADM

## 2024-04-29 RX ORDER — CLOPIDOGREL BISULFATE 75 MG/1
300 TABLET ORAL ONCE
Status: COMPLETED | OUTPATIENT
Start: 2024-04-29 | End: 2024-04-29

## 2024-04-29 RX ORDER — MIDAZOLAM HYDROCHLORIDE 5 MG/ML
INJECTION INTRAMUSCULAR; INTRAVENOUS PRN
Status: COMPLETED | OUTPATIENT
Start: 2024-04-29 | End: 2024-04-29

## 2024-04-29 RX ORDER — SODIUM CHLORIDE 450 MG/100ML
INJECTION, SOLUTION INTRAVENOUS CONTINUOUS
Status: DISCONTINUED | OUTPATIENT
Start: 2024-04-29 | End: 2024-04-29

## 2024-04-29 RX ORDER — MIDAZOLAM HYDROCHLORIDE 1 MG/ML
1 INJECTION INTRAMUSCULAR; INTRAVENOUS ONCE
Status: DISCONTINUED | OUTPATIENT
Start: 2024-04-29 | End: 2024-04-29 | Stop reason: HOSPADM

## 2024-04-29 RX ORDER — FENTANYL CITRATE 50 UG/ML
50 INJECTION, SOLUTION INTRAMUSCULAR; INTRAVENOUS ONCE
Status: DISCONTINUED | OUTPATIENT
Start: 2024-04-29 | End: 2024-04-29

## 2024-04-29 RX ORDER — LIDOCAINE 40 MG/G
CREAM TOPICAL ONCE
Status: DISCONTINUED | OUTPATIENT
Start: 2024-04-29 | End: 2024-04-29 | Stop reason: HOSPADM

## 2024-04-29 RX ORDER — LIDOCAINE HYDROCHLORIDE 20 MG/ML
INJECTION, SOLUTION EPIDURAL; INFILTRATION; INTRACAUDAL; PERINEURAL PRN
Status: COMPLETED | OUTPATIENT
Start: 2024-04-29 | End: 2024-04-29

## 2024-04-29 RX ORDER — MIDAZOLAM HYDROCHLORIDE 1 MG/ML
1 INJECTION INTRAMUSCULAR; INTRAVENOUS ONCE
Status: DISCONTINUED | OUTPATIENT
Start: 2024-04-29 | End: 2024-04-29

## 2024-04-29 RX ADMIN — Medication 0.5 MG: at 08:12

## 2024-04-29 RX ADMIN — SODIUM CHLORIDE: 4.5 INJECTION, SOLUTION INTRAVENOUS at 05:15

## 2024-04-29 RX ADMIN — IOPAMIDOL 25 ML: 612 INJECTION, SOLUTION INTRAVENOUS at 08:49

## 2024-04-29 RX ADMIN — LIDOCAINE HYDROCHLORIDE 2 ML: 20 INJECTION, SOLUTION EPIDURAL; INFILTRATION; INTRACAUDAL; PERINEURAL at 08:16

## 2024-04-29 RX ADMIN — CLOPIDOGREL BISULFATE 300 MG: 75 TABLET ORAL at 09:40

## 2024-04-29 RX ADMIN — HYDROMORPHONE HYDROCHLORIDE 0.5 MG: 1 INJECTION, SOLUTION INTRAMUSCULAR; INTRAVENOUS; SUBCUTANEOUS at 08:10

## 2024-04-29 RX ADMIN — Medication 0.5 MG: at 08:10

## 2024-04-29 NOTE — OP NOTE
Department of Radiology  Post Procedure Progress Note      Pre-Procedure Diagnosis:  Renal failure     Procedure Performed:  Ellipsys fistula creation     Anesthesia: local / versed and fentanyl    Findings: successful    Immediate Complications:  None    Estimated Blood Loss: minimal    SEE DICTATED PROCEDURE NOTE FOR COMPLETE DETAILS.    Armando Schmidt MD   4/29/2024 8:50 AM

## 2024-04-29 NOTE — FLOWSHEET NOTE
0500 Patient admitted to 2E17  Ambulatory for fistual creation.  Patient NPO. Patient accompanied by spouse  Vital signs obtained.   Assessment and data collection intiated.   Oriented to room.  Policies and procedures for 2E explained.   All questions answered with no further questions at this time.   Fall precautions reviewed with patient.     0500 Care plan reviewed with patient and spouse.  Patient and spouse verbalize understanding of the plan of care and contribute to goal setting.       0746 to IR per bed, stable condition.     0910 care taken over from IR, site with dressing to left forearm dry and intact.       1010 up in room, tolerated activity well.     1020 Discharge instructions given.   Patient goals/plan/treatment preferences discussed by this RN.  Discharge planning provided by this RN.  Patient goals/plan/treatment preferences reviewed with patient/family.  Patient/family verbalize understanding of discharge plan and are in agreement with goal/plan/treatment preferences.  Understanding was demonstrated using the teach back method.  AVS provided by this RN at time of discharge, which includes all necessary medical information pertaining to the patients current course of illness, treatment, post-discharge goals of care, and treatment preferences.       1030 Discharged per wheelchair, stable condition.

## 2024-04-29 NOTE — H&P
[facility]  Sedation/Analgesia History & Physical    Pt Name: Cruz Tarango  MRN: [id number]  YOB: 1945  Provider Performing Procedure: Armando Schmidt MD, MD  Primary Care Physician: Jessika Trevizo APRN - CNP    Formulation and discussion of sedation / procedure plans, risks, benefits, side effects and alternatives with patient and/or responsible adult completed.    PRE-PROCEDURE   DNR-CCA/DNR-CC []Yes [x]No  Brief History/Pre-Procedure Diagnosis: Renal failure           MEDICAL HISTORY  []CAD/Valve  []Liver Disease  []Lung Disease []Diabetes  []Hypertension []Renal Disease  []Additional information:       has a past medical history of Arthritis, Chronic kidney disease, Erythrocytosis, Gout, Hemodialysis patient (HCC), Hyperlipidemia, Hypertension, Prerenal azotemia, Proteinuria, and Vitamin D deficiency.    SURGICAL HISTORY   has a past surgical history that includes back surgery; Abdomen surgery; skin biopsy; eye surgery; other surgical history (Left, 5/20/2015); and Colon surgery.  Additional information:       ALLERGIES   Allergies as of 04/29/2024    (No Known Allergies)     Additional information:       MEDICATIONS   Coumadin Use Last 5 Days [x]No []Yes  Antiplatelet drug therapy use last 5 days  [x]No []Yes  Other anticoagulant use last 5 days  [x]No []Yes    Current Facility-Administered Medications:     ceFAZolin (ANCEF) 2,000 mg in sterile water 20 mL IV syringe, 2,000 mg, IntraVENous, 60 Min Pre-Op, Armando Schmidt MD    iopamidol (ISOVUE-370) 76 % injection 10 mL, 10 mL, Other, ONCE PRN, Armando Schmidt MD    0.45 % sodium chloride infusion, , IntraVENous, Continuous, Armando Schmidt MD, Last Rate: 20 mL/hr at 04/29/24 0515, New Bag at 04/29/24 0515    HYDROmorphone (DILAUDID) injection 1 mg, 1 mg, IntraVENous, Once, Armando Schmidt MD    lidocaine (LMX) 4 % cream, , Topical, Once, Armando Schmidt MD    midazolam (VERSED) injection 1 mg, 1 mg, IntraVENous, Once, Roxana  MD Armando    midazolam (VERSED) injection, , , PRN, Armando Schmidt MD, 0.5 mg at 04/29/24 0810    HYDROmorphone (DILAUDID) injection, , , PRN, Armando Schmidt MD, 0.5 mg at 04/29/24 0810  Prior to Admission medications    Medication Sig Start Date End Date Taking? Authorizing Provider   polycarbophil (FIBERCON) 625 MG tablet Take 1 tablet by mouth daily    Emmanuel Hui MD   ALLOPURINOL PO Take by mouth daily    Emmanuel Hui MD   calcitRIOL (ROCALTROL) 0.25 MCG capsule Take 2 capsules by mouth daily 3/8/23   Davy Kinney MD   sodium bicarbonate 650 MG tablet Take 2 tablets by mouth 3 times daily 2/15/23   Davy Kinney MD   Cholecalciferol (VITAMIN D) 2000 units CAPS capsule Take 2,000 Units by mouth daily    Emmanuel Hui MD   Omega-3 Fatty Acids (FISH OIL) 1000 MG CAPS Take 3 capsules by mouth daily    ProviderEmmanuel MD     Additional information:       VITAL SIGNS   Vitals:    04/29/24 0807   BP: (!) 163/83   Pulse: 82   Resp: 21   Temp:    SpO2: 96%       PHYSICAL:   Heart:  [x]Regular rate and rhythm  []Other:    Lungs:  [x]Clear    []Other:    Abdomen: [x]Soft    []Other:    Mental Status: [x]Alert & Oriented  []Other:      PLANNED PROCEDURE   []Biospy []Arteriogram              []Drainage   []Mediport Insertion  []Fistulogram []IV access       []Vertebroplasty / Augmentation  []IVC filter []Dialysis catheter []Biliary drainage  [x]Other:Ellipsys fistula creation  []CAPD Catheter []Nephrostomy Tube / Stent  SEDATION  Planned agent:[x]Midazolam []Meperidine []Sublimaze [x]Dilaudid []Morphine     []Diazepam  []Other:     ASA Classification:  []1 [x]2 []3 []4 []5  Class 1: A normal healthy patient  Class 2: Pt with mild to moderate systemic disease  Class 3: Severe systemic disease or disturbance  Class 4: Severe systemic disorders that are already life threatening.  Class 5: Moribund pt with little chances of survival, for more than 24 hours.  Mallampati I Airway

## 2024-04-29 NOTE — DISCHARGE INSTRUCTIONS
Do not drive for 24 hours.  Do not subject arm to any forceful movement for 24 hours.  Do not lift anything greater than 5 lbs for 1 week.   Do not operate a lawnmower, motorcycle, chainsaw, or all-terrain vehicle for 1 week.  Squeeze a stress ball periodically throughout the day.  NO BLOOD PRESSURE OR BLOOD DRAWS TO LEFT ARM.\    You may sower after 24 hours.  AVOID hot tubs, pools, and baths for 1 week.    Keep site clean and dry  You may remove the dressing after 24 hours.  If you develop sudden swelling or bleeding at the site, hold firm pressure for 10 minutes.   Bleeding should stop after 10 minutes, but if you have continued bleeding call 911.  If you notice infection or fever over 101. Please call your family physician.

## 2024-04-29 NOTE — PROGRESS NOTES
0753 Patient received in IR for ellipsys fistula placement  0755 This procedure has been fully reviewed with the patient and written informed consent has been obtained.  0809 Procedure started with   0815 Lidocaine to left AC  0822 Ellipsys device prepped and advanced   0828 Fistula created  0829 Dayne 5x20mm balloon prepped and advanced for fistula dilation    0840 Fistula gram performed   0847 Procedure completed; patient tolerated well. Quikclot to and op site dressing applied no bleeding noted.  0853 arm board to stay in place for 2 hours  0857 Patient on bed; comfort ensured.  0900 Patient taken to 2E via IR staff. Report to be given in room with 2E RN

## 2024-04-29 NOTE — OP NOTE
Formulation and discussion of sedation / procedure plans, risks, benefits, side effects and alternatives with patient and/or responsible adult completed.    History and Physical reviewed and unchanged.    Electronically signed by Armando Schmidt MD on 4/29/24 at 8:11 AM EDT

## 2024-05-09 ENCOUNTER — HOSPITAL ENCOUNTER (OUTPATIENT)
Dept: INTERVENTIONAL RADIOLOGY/VASCULAR | Age: 79
Discharge: HOME | End: 2024-05-11
Attending: RADIOLOGY
Payer: MEDICARE

## 2024-05-09 ENCOUNTER — HOSPITAL ENCOUNTER (OUTPATIENT)
Dept: INTERVENTIONAL RADIOLOGY/VASCULAR | Age: 79
Discharge: HOME | End: 2024-05-09
Attending: RADIOLOGY
Payer: MEDICARE

## 2024-05-09 DIAGNOSIS — N18.6 ESRD (END STAGE RENAL DISEASE) (HCC): ICD-10-CM

## 2024-05-09 DIAGNOSIS — N19 RENAL FAILURE SYNDROME: ICD-10-CM

## 2024-05-09 DIAGNOSIS — N18.4 CKD (CHRONIC KIDNEY DISEASE), STAGE IV (HCC): ICD-10-CM

## 2024-05-09 PROCEDURE — 99212 OFFICE O/P EST SF 10 MIN: CPT

## 2024-05-09 PROCEDURE — 93990 DOPPLER FLOW TESTING: CPT

## 2024-06-13 ENCOUNTER — HOSPITAL ENCOUNTER (OUTPATIENT)
Dept: INTERVENTIONAL RADIOLOGY/VASCULAR | Age: 79
Discharge: HOME | End: 2024-06-15
Attending: RADIOLOGY
Payer: MEDICARE

## 2024-06-13 ENCOUNTER — HOSPITAL ENCOUNTER (OUTPATIENT)
Dept: INTERVENTIONAL RADIOLOGY/VASCULAR | Age: 79
Discharge: HOME | End: 2024-06-13
Attending: RADIOLOGY
Payer: MEDICARE

## 2024-06-13 DIAGNOSIS — N19 RENAL FAILURE SYNDROME: ICD-10-CM

## 2024-06-13 DIAGNOSIS — N18.6 ESRD (END STAGE RENAL DISEASE) (HCC): ICD-10-CM

## 2024-06-13 DIAGNOSIS — N18.4 CKD (CHRONIC KIDNEY DISEASE), STAGE IV (HCC): ICD-10-CM

## 2024-06-13 PROCEDURE — 99212 OFFICE O/P EST SF 10 MIN: CPT

## 2024-06-13 PROCEDURE — 93990 DOPPLER FLOW TESTING: CPT

## 2024-06-20 ENCOUNTER — HOSPITAL ENCOUNTER (OUTPATIENT)
Dept: INTERVENTIONAL RADIOLOGY/VASCULAR | Age: 79
Discharge: HOME | End: 2024-06-20
Payer: MEDICARE

## 2024-06-20 VITALS
HEART RATE: 75 BPM | DIASTOLIC BLOOD PRESSURE: 83 MMHG | RESPIRATION RATE: 16 BRPM | SYSTOLIC BLOOD PRESSURE: 156 MMHG | OXYGEN SATURATION: 95 % | TEMPERATURE: 97.1 F

## 2024-06-20 DIAGNOSIS — N18.6 ESRD (END STAGE RENAL DISEASE) (HCC): ICD-10-CM

## 2024-06-20 DIAGNOSIS — N19 RENAL FAILURE SYNDROME: Primary | ICD-10-CM

## 2024-06-20 DIAGNOSIS — N18.4 CKD (CHRONIC KIDNEY DISEASE), STAGE IV (HCC): ICD-10-CM

## 2024-06-20 PROCEDURE — 2580000003 HC RX 258: Performed by: RADIOLOGY

## 2024-06-20 PROCEDURE — 2500000003 HC RX 250 WO HCPCS: Performed by: RADIOLOGY

## 2024-06-20 PROCEDURE — 6370000000 HC RX 637 (ALT 250 FOR IP): Performed by: RADIOLOGY

## 2024-06-20 PROCEDURE — 36902 INTRO CATH DIALYSIS CIRCUIT: CPT

## 2024-06-20 PROCEDURE — 75710 ARTERY X-RAYS ARM/LEG: CPT

## 2024-06-20 PROCEDURE — 2709999900 IR FISTULAGRAM

## 2024-06-20 PROCEDURE — 36215 PLACE CATHETER IN ARTERY: CPT

## 2024-06-20 PROCEDURE — 6360000004 HC RX CONTRAST MEDICATION: Performed by: RADIOLOGY

## 2024-06-20 PROCEDURE — 6360000002 HC RX W HCPCS: Performed by: RADIOLOGY

## 2024-06-20 RX ORDER — LIDOCAINE 40 MG/G
CREAM TOPICAL ONCE
Status: COMPLETED | OUTPATIENT
Start: 2024-06-20 | End: 2024-06-20

## 2024-06-20 RX ORDER — SODIUM CHLORIDE 450 MG/100ML
INJECTION, SOLUTION INTRAVENOUS CONTINUOUS
Status: DISCONTINUED | OUTPATIENT
Start: 2024-06-20 | End: 2024-06-21 | Stop reason: HOSPADM

## 2024-06-20 RX ORDER — MIDAZOLAM HYDROCHLORIDE 1 MG/ML
1 INJECTION INTRAMUSCULAR; INTRAVENOUS ONCE
Status: COMPLETED | OUTPATIENT
Start: 2024-06-20 | End: 2024-06-20

## 2024-06-20 RX ORDER — VERAPAMIL HYDROCHLORIDE 2.5 MG/ML
INJECTION, SOLUTION INTRAVENOUS PRN
Status: COMPLETED | OUTPATIENT
Start: 2024-06-20 | End: 2024-06-20

## 2024-06-20 RX ORDER — HEPARIN SODIUM 1000 [USP'U]/ML
INJECTION, SOLUTION INTRAVENOUS; SUBCUTANEOUS PRN
Status: COMPLETED | OUTPATIENT
Start: 2024-06-20 | End: 2024-06-20

## 2024-06-20 RX ADMIN — MIDAZOLAM 0.5 MG: 1 INJECTION INTRAMUSCULAR; INTRAVENOUS at 08:05

## 2024-06-20 RX ADMIN — HYDROMORPHONE HYDROCHLORIDE 0.5 MG: 1 INJECTION, SOLUTION INTRAMUSCULAR; INTRAVENOUS; SUBCUTANEOUS at 08:05

## 2024-06-20 RX ADMIN — SODIUM CHLORIDE: 4.5 INJECTION, SOLUTION INTRAVENOUS at 06:50

## 2024-06-20 RX ADMIN — HEPARIN SODIUM 2500 UNITS: 1000 INJECTION INTRAVENOUS; SUBCUTANEOUS at 08:16

## 2024-06-20 RX ADMIN — IOPAMIDOL 63 ML: 612 INJECTION, SOLUTION INTRAVENOUS at 09:10

## 2024-06-20 RX ADMIN — Medication 250 MCG: at 08:16

## 2024-06-20 RX ADMIN — VERAPAMIL HYDROCHLORIDE 2.5 MG: 2.5 INJECTION, SOLUTION INTRAVENOUS at 08:16

## 2024-06-20 RX ADMIN — LIDOCAINE 4%: 4 CREAM TOPICAL at 06:50

## 2024-06-20 ASSESSMENT — PAIN SCALES - GENERAL
PAINLEVEL_OUTOF10: 0

## 2024-06-20 NOTE — H&P
Formulation and discussion of sedation / procedure plans, risks, benefits, side effects and alternatives with patient and/or responsible adult completed.    History and Physical reviewed and unchanged.    Electronically signed by Armando Schmidt MD on 6/20/24 at 7:52 AM EDT   
Acids (FISH OIL) 1000 MG CAPS, Take 3 capsules by mouth daily, Disp: , Rfl:     Current Facility-Administered Medications:     0.45 % sodium chloride infusion, , IntraVENous, Continuous, Armando Schmidt MD, Last Rate: 20 mL/hr at 06/20/24 0650, New Bag at 06/20/24 0650    HYDROmorphone (DILAUDID) injection 1 mg, 1 mg, IntraVENous, Once, Armando Schmidt MD    midazolam (VERSED) injection 1 mg, 1 mg, IntraVENous, Once, Armando Schmidt MD  Prior to Admission medications    Medication Sig Start Date End Date Taking? Authorizing Provider   polycarbophil (FIBERCON) 625 MG tablet Take 1 tablet by mouth daily    Emmanuel Hui MD   ALLOPURINOL PO Take by mouth daily    Emmanuel Hui MD   calcitRIOL (ROCALTROL) 0.25 MCG capsule Take 2 capsules by mouth daily 3/8/23   Davy Kinney MD   sodium bicarbonate 650 MG tablet Take 2 tablets by mouth 3 times daily 2/15/23   Davy Kinney MD   Cholecalciferol (VITAMIN D) 2000 units CAPS capsule Take 2,000 Units by mouth daily    Emmanuel Hui MD   Omega-3 Fatty Acids (FISH OIL) 1000 MG CAPS Take 3 capsules by mouth daily    ProviderEmmanuel MD     Additional information:       VITAL SIGNS   Vitals:    06/20/24 0639   BP: 133/81   Pulse: 95   Resp: 18   Temp: 97.1 °F (36.2 °C)   SpO2: 94%       PHYSICAL:   Heart:  [x]Regular rate and rhythm  []Other:    Lungs:  [x]Clear    []Other:    Abdomen: [x]Soft    []Other:    Mental Status: [x]Alert & Oriented  []Other:      PLANNED PROCEDURE   []Biospy []Arteriogram              []Drainage   []Mediport Insertion  [x]Fistulogram []IV access       []Vertebroplasty / Augmentation  []IVC filter []Dialysis catheter []Biliary drainage  []Other: []CAPD Catheter []Nephrostomy Tube / Stent  SEDATION  Planned agent:[x]Midazolam []Meperidine []Sublimaze [x]Dilaudid []Morphine     []Diazepam  []Other:     ASA Classification:  []1 [x]2 []3 []4 []5  Class 1: A normal healthy patient  Class 2: Pt with mild to moderate

## 2024-06-20 NOTE — PROGRESS NOTES
0808 Report received from April.  0815 Access with ultrasound to left radial artery.  0832 Access to cephalic vein with ultrasound.   0853 Angioplasty of the  vein with a Dayne 5 x 20 balloon.  0855 Angioplasty of the radial artery with a Dayne 5 x 20 balloon.  0903 Angioplasty of the  vein with an 0.18 IN.PACT Admiral 6 x 40 balloon.   0915 Procedure completed; patient tolerated well. Slip knot to venous site; no bleeding noted. Vasc band with 7ml of air to radial artery site; no bleeding noted.  0920 Patient on cart; comfort ensured.  0922 Patient taken to OPN via cart.

## 2024-06-20 NOTE — DISCHARGE INSTRUCTIONS
KEEP DRESSING CLEAN AND DRY UNTIL DIALYSIS APPOINTMENT.    IF SEVERE BLEEDING, PAIN OR SWELLING OCCURS, GO TO EMERGENCY ROOM.               SEDATION/ANALGESIA INFORMATION HOME GOING ADVICE    Review the following information with the patient prior to the procedure.  Sedation/agalgesia is used during short medical procedures under controlled supervision.  The medication will produce a strong relaxation.  You will be able to hear, speak and follow instructions, but you memory and alertness will be decreased.  You will be able to swallow and breathe on your own.  During sedation/analgesia you blood pressure, hear and breathing will be watched closely.  After the procedure, you may not remember what was said or done.    Procedure: fistulagram     Date: 6/20/24  You may have the following effects from the medication.  Drowsiness, dizziness, sleepiness or confusion.  Difficulty remembering or delayed reaction times.  Loss of fine muscle control or difficulty with your balance especially while walking.  Difficulty focusing or blurred vision.  You may not be aware of slight changes in your behavior and/or your reaction time because of the medication used during the procedure.  Therefore you should follow these instructions.  Have someone responsible help you with your care.  Do not drive for 24 hours.  Do not operate equipment for 24 hours (lawnmowers, power tools, kitchen accessories, stove).  Do not drink any alcoholic beverages for a minimum of 24 hours.  Do not make important personal, legal or business decisions for 24 hours.  You may experience dizziness or lightheadedness.  Move slowly and carefully, do not make sudden position changes.  Drink extra amounts of fluids today.  Increase your diet as tolerated (unless you have received specific instructions from your doctor).  If you feel nauseated, continue with liquids until nausea is gone  Notify your physician if you have not urinated within 8 hours after the

## 2024-06-20 NOTE — OP NOTE
Department of Radiology  Post Procedure Progress Note      Pre-Procedure Diagnosis: Malfunctioning dialysis fistula/graft     Procedure Performed:  Dialysis fistulagram with angioplasty     Anesthesia: local / versed and dilaudid    Findings: successful    Immediate Complications:  None    Estimated Blood Loss: minimal    SEE DICTATED PROCEDURE NOTE FOR COMPLETE DETAILS.    Armando Schmidt MD   6/20/2024 9:16 AM

## 2024-06-20 NOTE — PROGRESS NOTES
0751 Pt arrived to special procedure room 2 for fistulagram with possible intervention under conscious sedation. Patient states wife remains in OPN.   0752 Procedure explained using teach back method. Pt states understanding.  0756 Dr Schmidt into assess patient and explain procedure. This procedure has been fully reviewed with the patient and written informed consent has been obtained.   0759 Patient assisted to table in supine position. Monitor attached to patient. Comfort ensured.  0808 Care turned over to ADILIA Nicole RN

## 2024-06-20 NOTE — PROGRESS NOTES
0630 pt arrives ambulatory with spouse for fistulagram. Procedure explained and questions answered. PT RIGHTS AND RESPONSIBILITIES OFFERED TO PT. Pt has been NPO since 6/19/24.  0747 pt to IR via bed.   0936 pt back from hospitals. Vitals stable. Pt denies pain. Vascular assessment documented. Vascband on left wrist noted. No bleeding drainage redness or swelling noted. Slipknot noted on left mid arm. No bleeding drainage redness or swelling noted. Spouse at bedside.   0948 vitals stable.slipknot and vascband unchanged.  1002 vitals stable. Slipknot and vascband unchanged. Pt denies needs. Pt denies pain. Wife at bedside.   1015 vitals stable. Slipknot and vascband unchanged. Pt resting quietly.   1050 slip knot removed. Firm pressure held. No bleeding noted. Pressure dressing applied and manual pressure held for 5 min. No bleeding noted. Pt tolerated it well.   1115 vitals stable. 2.5ml air removed from vacband. Pt denies pain. Pt denies food or drink.  1130 2.5ml air removed from vascband. Pt tolerated it well. No bleeding noted. Spouse at bedside.   1145 2.5ml air removed from vascband. Pt tolerated it well. No bleeding noted.   1200 vascband removed. No bleeding drainage or swelling noted. Dressing applied. Pt tolerated it well with no complaints. Spouse at bedside.   1300 pt discharged via wheelchair with spouse with no complaints. Dressing on left wrist clean, dry and intact. No bleeding drainage redness or swelling noted.  Dressing on left mid arm clean, dry and intact. No bleeding drainage redness or swelling noted.           _m___ Safety:       (Environmental)  [place] to environment  Ensure ID band is correct and in place/ allergy band as needed  Assess for fall risk  Initiate fall precautions as applicable (fall band, side rails, etc.)  Call light within reach  Bed in low position/ wheels locked    __m__ Pain:       Assess pain level and characteristics  Administer analgesics as ordered  Assess

## 2024-07-02 ENCOUNTER — HOSPITAL ENCOUNTER (OUTPATIENT)
Dept: INTERVENTIONAL RADIOLOGY/VASCULAR | Age: 79
Discharge: HOME | End: 2024-07-02
Attending: RADIOLOGY
Payer: MEDICARE

## 2024-07-02 ENCOUNTER — HOSPITAL ENCOUNTER (OUTPATIENT)
Dept: INTERVENTIONAL RADIOLOGY/VASCULAR | Age: 79
Discharge: HOME | End: 2024-07-04
Attending: RADIOLOGY
Payer: MEDICARE

## 2024-07-02 DIAGNOSIS — N19 RENAL FAILURE SYNDROME: ICD-10-CM

## 2024-07-02 DIAGNOSIS — N18.4 CKD (CHRONIC KIDNEY DISEASE), STAGE IV (HCC): ICD-10-CM

## 2024-07-02 DIAGNOSIS — N18.6 ESRD (END STAGE RENAL DISEASE) (HCC): ICD-10-CM

## 2024-07-02 PROCEDURE — 93990 DOPPLER FLOW TESTING: CPT

## 2024-07-02 PROCEDURE — 99212 OFFICE O/P EST SF 10 MIN: CPT

## 2024-12-16 RX ORDER — ALLOPURINOL 100 MG/1
100 TABLET ORAL DAILY
Qty: 90 TABLET | Refills: 0 | OUTPATIENT
Start: 2024-12-16

## 2024-12-31 ENCOUNTER — HOSPITAL ENCOUNTER (EMERGENCY)
Age: 79
Discharge: HOME | End: 2024-12-31
Attending: FAMILY MEDICINE
Payer: MEDICARE

## 2024-12-31 VITALS
RESPIRATION RATE: 13 BRPM | TEMPERATURE: 97.6 F | DIASTOLIC BLOOD PRESSURE: 87 MMHG | HEIGHT: 75 IN | BODY MASS INDEX: 24.49 KG/M2 | SYSTOLIC BLOOD PRESSURE: 152 MMHG | WEIGHT: 197 LBS | HEART RATE: 89 BPM | OXYGEN SATURATION: 96 %

## 2024-12-31 DIAGNOSIS — I49.3 PREMATURE VENTRICULAR CONTRACTIONS: Primary | ICD-10-CM

## 2024-12-31 DIAGNOSIS — Z99.2 END STAGE RENAL DISEASE ON DIALYSIS (HCC): ICD-10-CM

## 2024-12-31 DIAGNOSIS — N18.6 END STAGE RENAL DISEASE ON DIALYSIS (HCC): ICD-10-CM

## 2024-12-31 LAB
ALBUMIN SERPL BCP-MCNC: 2.9 GM/DL (ref 3.4–5)
ALP SERPL-CCNC: 82 U/L (ref 46–116)
ALT SERPL W P-5'-P-CCNC: 24 U/L (ref 14–63)
ANION GAP SERPL CALC-SCNC: 8 MEQ/L (ref 8–16)
AST SERPL W P-5'-P-CCNC: 25 U/L (ref 15–37)
BASOPHILS # BLD: 0.3 % (ref 0–3)
BASOPHILS ABSOLUTE: 0 THOU/MM3 (ref 0–0.1)
BILIRUB SERPL-MCNC: 1 MG/DL (ref 0.2–1)
BUN SERPL-MCNC: 17 MG/DL (ref 7–18)
CALCIUM SERPL-MCNC: 9.2 MG/DL (ref 8.5–10.1)
CHLORIDE SERPL-SCNC: 98 MEQ/L (ref 98–107)
CO2 SERPL-SCNC: 32 MEQ/L (ref 21–32)
CREAT SERPL-MCNC: 4.5 MG/DL (ref 0.6–1.3)
EKG ATRIAL RATE: 91 BPM
EKG ATRIAL RATE: 91 BPM
EKG P AXIS: 30 DEGREES
EKG P AXIS: 36 DEGREES
EKG P-R INTERVAL: 160 MS
EKG P-R INTERVAL: 182 MS
EKG Q-T INTERVAL: 390 MS
EKG Q-T INTERVAL: 404 MS
EKG QRS DURATION: 74 MS
EKG QRS DURATION: 78 MS
EKG QTC CALCULATION (BAZETT): 479 MS
EKG QTC CALCULATION (BAZETT): 496 MS
EKG R AXIS: -22 DEGREES
EKG R AXIS: -6 DEGREES
EKG T AXIS: 46 DEGREES
EKG T AXIS: 62 DEGREES
EKG VENTRICULAR RATE: 91 BPM
EKG VENTRICULAR RATE: 91 BPM
EOSINOPHILS ABSOLUTE: 0.2 THOU/MM3 (ref 0–0.5)
EOSINOPHILS RELATIVE PERCENT: 2.6 % (ref 0–4)
GFR SERPL CREATININE-BSD FRML MDRD: 12 ML/MIN/1.73M2
GLUCOSE SERPL-MCNC: 98 MG/DL (ref 74–106)
HCT VFR BLD CALC: 51.1 % (ref 42–52)
HEMOGLOBIN: 16.5 GM/DL (ref 14–18)
IMMATURE GRANS (ABS): 0 THOU/MM3 (ref 0–0.07)
IMMATURE GRANULOCYTES %: 0 %
LYMPHOCYTES # BLD AUTO: 17.8 % (ref 15–47)
LYMPHOCYTES ABSOLUTE: 1.3 THOU/MM3 (ref 1–4.8)
MCH RBC QN AUTO: 32.7 PG (ref 26–32)
MCHC RBC AUTO-ENTMCNC: 32.3 GM/DL (ref 31–35)
MCV RBC AUTO: 101.2 FL (ref 80–94)
MONOCYTES: 0.8 THOU/MM3 (ref 0.3–1.3)
MONOCYTES: 10.4 % (ref 0–12)
NEUTROPHILS ABSOLUTE: 5 THOU/MM3 (ref 1.8–7.7)
PDW BLD-RTO: 15.2 % (ref 11.5–14.9)
PLATELET # BLD AUTO: 106 THOU/MM3 (ref 130–400)
PMV BLD AUTO: 9.6 FL (ref 9.4–12.4)
POTASSIUM SERPL-SCNC: 4 MEQ/L (ref 3.5–5.1)
PROT SERPL-MCNC: 7.3 GM/DL (ref 6.4–8.2)
RBC # BLD: 5.05 MILL/MM3 (ref 4.5–6.1)
SEG NEUTROPHILS: 68.8 % (ref 43–75)
SODIUM SERPL-SCNC: 138 MEQ/L (ref 136–145)
TROPONIN, HIGH SENSITIVITY: 11.4 PG/ML (ref 0–76.1)
WBC # BLD: 7.3 THOU/MM3 (ref 4.8–10.8)

## 2024-12-31 PROCEDURE — 36415 COLL VENOUS BLD VENIPUNCTURE: CPT

## 2024-12-31 PROCEDURE — 93010 ELECTROCARDIOGRAM REPORT: CPT | Performed by: INTERNAL MEDICINE

## 2024-12-31 PROCEDURE — 93005 ELECTROCARDIOGRAM TRACING: CPT | Performed by: FAMILY MEDICINE

## 2024-12-31 PROCEDURE — 99284 EMERGENCY DEPT VISIT MOD MDM: CPT

## 2024-12-31 PROCEDURE — 84484 ASSAY OF TROPONIN QUANT: CPT

## 2024-12-31 PROCEDURE — 80053 COMPREHEN METABOLIC PANEL: CPT

## 2024-12-31 PROCEDURE — 85025 COMPLETE CBC W/AUTO DIFF WBC: CPT

## 2024-12-31 ASSESSMENT — ENCOUNTER SYMPTOMS
COUGH: 0
NAUSEA: 0
SHORTNESS OF BREATH: 0
VOMITING: 0

## 2024-12-31 ASSESSMENT — PAIN - FUNCTIONAL ASSESSMENT
PAIN_FUNCTIONAL_ASSESSMENT: NONE - DENIES PAIN
PAIN_FUNCTIONAL_ASSESSMENT: NONE - DENIES PAIN

## 2024-12-31 NOTE — ED PROVIDER NOTES
[facility]  eMERGENCY dEPARTMENT eNCOUnter          CHIEF COMPLAINT       Chief Complaint   Patient presents with    Other     In dialysis this am, heart rate dropped to 40. Pt to Tr. 1, heart rate is 90, pt denies SOB, lightheadedness.        Nurses Notes reviewed and I agree except as noted in the HPI.      HISTORY OF PRESENT ILLNESS    Cruz Tarango is a 79 y.o. male who presents after finishing dialysis. Patient was observed by dialysis nurses having period of bradycardia. Patient denies any chest pain,shortness of breath. Denies nausea,vomiting.          REVIEW OF SYSTEMS     Review of Systems   Constitutional:  Negative for chills and fever.   Respiratory:  Negative for cough and shortness of breath.    Cardiovascular:  Negative for chest pain, palpitations and leg swelling.   Gastrointestinal:  Negative for nausea and vomiting.   Skin:  Negative for pallor.   Neurological:  Negative for dizziness, weakness and light-headedness.   All other systems reviewed and are negative.        PAST MEDICAL HISTORY    has a past medical history of Arthritis, Chronic kidney disease, Erythrocytosis, Gout, Hemodialysis patient (HCC), Hyperlipidemia, Hypertension, Prerenal azotemia, Proteinuria, and Vitamin D deficiency.    SURGICAL HISTORY      has a past surgical history that includes back surgery; Abdomen surgery; skin biopsy; eye surgery; other surgical history (Left, 5/20/2015); Colon surgery; and IR PERC ARTERIOVENOUS FISTULA CREATION (4/29/2024).    CURRENT MEDICATIONS       Previous Medications    ALLOPURINOL PO    Take by mouth daily    CALCITRIOL (ROCALTROL) 0.25 MCG CAPSULE    Take 2 capsules by mouth daily    CHOLECALCIFEROL (VITAMIN D) 2000 UNITS CAPS CAPSULE    Take 1 capsule by mouth daily    OMEGA-3 FATTY ACIDS (FISH OIL) 1000 MG CAPS    Take 3 capsules by mouth daily    POLYCARBOPHIL (FIBERCON) 625 MG TABLET    Take 1 tablet by mouth daily       ALLERGIES     is allergic to adhesive

## 2024-12-31 NOTE — ED NOTES
Pt pink, warm and dry, breathing with ease. Prescription explained, pt states understanding. AVS reviewed. Denies questions or concerns. Pt remains alert and oriented. Pt discharged in stable condition.